# Patient Record
Sex: MALE | Race: WHITE | Employment: FULL TIME | URBAN - NONMETROPOLITAN AREA
[De-identification: names, ages, dates, MRNs, and addresses within clinical notes are randomized per-mention and may not be internally consistent; named-entity substitution may affect disease eponyms.]

---

## 2023-04-06 ENCOUNTER — OFFICE VISIT (OUTPATIENT)
Age: 23
End: 2023-04-06
Payer: COMMERCIAL

## 2023-04-06 VITALS
RESPIRATION RATE: 20 BRPM | BODY MASS INDEX: 30.69 KG/M2 | WEIGHT: 252 LBS | DIASTOLIC BLOOD PRESSURE: 82 MMHG | TEMPERATURE: 99.2 F | OXYGEN SATURATION: 99 % | HEART RATE: 94 BPM | HEIGHT: 76 IN | SYSTOLIC BLOOD PRESSURE: 138 MMHG

## 2023-04-06 DIAGNOSIS — R19.7 DIARRHEA, UNSPECIFIED TYPE: Primary | ICD-10-CM

## 2023-04-06 LAB
ADV 40+41 DNA STL QL NAA+NON-PROBE: NOT DETECTED
C CAYETANENSIS DNA STL QL NAA+NON-PROBE: NOT DETECTED
C COLI+JEJ+UPSA DNA STL QL NAA+NON-PROBE: NOT DETECTED
C DIF TOX TCDA+TCDB STL QL NAA+NON-PROBE: NOT DETECTED
CRYPTOSP DNA STL QL NAA+NON-PROBE: NOT DETECTED
E HISTOLYT DNA STL QL NAA+NON-PROBE: NOT DETECTED
EAEC PAA PLAS AGGR+AATA ST NAA+NON-PRB: NOT DETECTED
EC STX1+STX2 GENES STL QL NAA+NON-PROBE: NOT DETECTED
EPEC EAE GENE STL QL NAA+NON-PROBE: NOT DETECTED
ETEC LTA+ST1A+ST1B TOX ST NAA+NON-PROBE: NOT DETECTED
G LAMBLIA DNA STL QL NAA+NON-PROBE: NOT DETECTED
GI PATH DNA+RNA PNL STL NAA+NON-PROBE: NOT DETECTED
NOROVIRUS GI+II RNA STL QL NAA+NON-PROBE: NOT DETECTED
P SHIGELLOIDES DNA STL QL NAA+NON-PROBE: NOT DETECTED
RVA RNA STL QL NAA+NON-PROBE: NOT DETECTED
S ENT+BONG DNA STL QL NAA+NON-PROBE: NOT DETECTED
SAPO I+II+IV+V RNA STL QL NAA+NON-PROBE: NOT DETECTED
SHIGELLA SP+EIEC IPAH ST NAA+NON-PROBE: NOT DETECTED
V CHOL+PARA+VUL DNA STL QL NAA+NON-PROBE: NOT DETECTED
V CHOLERAE DNA STL QL NAA+NON-PROBE: NOT DETECTED
Y ENTEROCOL DNA STL QL NAA+NON-PROBE: NOT DETECTED

## 2023-04-06 PROCEDURE — 99203 OFFICE O/P NEW LOW 30 MIN: CPT | Performed by: NURSE PRACTITIONER

## 2023-04-06 SDOH — HEALTH STABILITY: PHYSICAL HEALTH: ON AVERAGE, HOW MANY DAYS PER WEEK DO YOU ENGAGE IN MODERATE TO STRENUOUS EXERCISE (LIKE A BRISK WALK)?: 4 DAYS

## 2023-04-06 SDOH — HEALTH STABILITY: PHYSICAL HEALTH: ON AVERAGE, HOW MANY MINUTES DO YOU ENGAGE IN EXERCISE AT THIS LEVEL?: 90 MIN

## 2023-04-06 ASSESSMENT — ENCOUNTER SYMPTOMS
WHEEZING: 0
TROUBLE SWALLOWING: 0
EYE PAIN: 0
EYE DISCHARGE: 0
CHEST TIGHTNESS: 0
DIARRHEA: 1
COUGH: 0
ABDOMINAL PAIN: 1
STRIDOR: 0
COLOR CHANGE: 0
CRAMPS: 1
SINUS PRESSURE: 0
SORE THROAT: 0
ABDOMINAL DISTENTION: 0
SHORTNESS OF BREATH: 0

## 2023-04-06 ASSESSMENT — SOCIAL DETERMINANTS OF HEALTH (SDOH)

## 2023-04-06 NOTE — PATIENT INSTRUCTIONS
GI panel pending  BRAT diet  Patient declines work note  Recommend probiotic  If symptoms worsen go to ER    Patient verbalized understanding and agrees to treatment plan.

## 2023-04-06 NOTE — PROGRESS NOTES
Negative for congestion, sinus pressure, sore throat and trouble swallowing. Eyes:  Negative for pain and discharge. Respiratory:  Negative for cough, chest tightness, shortness of breath, wheezing and stridor. Cardiovascular:  Negative for chest pain and palpitations. Gastrointestinal:  Positive for abdominal pain and diarrhea. Negative for abdominal distention. Genitourinary:  Negative for difficulty urinating, dysuria and hematuria. Musculoskeletal:  Negative for arthralgias, neck pain and neck stiffness. Skin:  Negative for color change and rash. Neurological:  Negative for dizziness, syncope, speech difficulty, weakness and numbness. Psychiatric/Behavioral:  Negative for confusion and suicidal ideas. Objective    Physical Exam  Vitals and nursing note reviewed. Constitutional:       General: He is not in acute distress. Appearance: Normal appearance. HENT:      Head: Normocephalic. Right Ear: External ear normal.      Left Ear: External ear normal.      Nose: Nose normal. No congestion. Mouth/Throat:      Mouth: Mucous membranes are moist.      Pharynx: Oropharynx is clear. No posterior oropharyngeal erythema. Eyes:      Conjunctiva/sclera: Conjunctivae normal.      Pupils: Pupils are equal, round, and reactive to light. Cardiovascular:      Rate and Rhythm: Normal rate and regular rhythm. Pulses: Normal pulses. Heart sounds: Normal heart sounds. No murmur heard. Pulmonary:      Effort: Pulmonary effort is normal. No respiratory distress. Breath sounds: Normal breath sounds. No stridor. No wheezing. Abdominal:      General: Abdomen is flat. There is no distension. Tenderness: There is no abdominal tenderness. Comments: Bowel sounds hyperactive in all four quadrants   Musculoskeletal:         General: No swelling or deformity. Normal range of motion. Cervical back: Normal range of motion. No rigidity or tenderness.    Skin:

## 2023-04-07 ENCOUNTER — OFFICE VISIT (OUTPATIENT)
Dept: INTERNAL MEDICINE | Age: 23
End: 2023-04-07

## 2023-04-07 VITALS
OXYGEN SATURATION: 99 % | SYSTOLIC BLOOD PRESSURE: 132 MMHG | DIASTOLIC BLOOD PRESSURE: 82 MMHG | HEART RATE: 85 BPM | WEIGHT: 247 LBS | HEIGHT: 76 IN | BODY MASS INDEX: 30.08 KG/M2

## 2023-04-07 DIAGNOSIS — B07.9 VIRAL WARTS, UNSPECIFIED TYPE: ICD-10-CM

## 2023-04-07 DIAGNOSIS — Z00.00 ANNUAL PHYSICAL EXAM: Primary | ICD-10-CM

## 2023-04-07 PROBLEM — S92.919A TOE FRACTURE: Status: ACTIVE | Noted: 2023-04-07

## 2023-04-07 SDOH — ECONOMIC STABILITY: FOOD INSECURITY: WITHIN THE PAST 12 MONTHS, YOU WORRIED THAT YOUR FOOD WOULD RUN OUT BEFORE YOU GOT MONEY TO BUY MORE.: NEVER TRUE

## 2023-04-07 SDOH — ECONOMIC STABILITY: INCOME INSECURITY: HOW HARD IS IT FOR YOU TO PAY FOR THE VERY BASICS LIKE FOOD, HOUSING, MEDICAL CARE, AND HEATING?: NOT HARD AT ALL

## 2023-04-07 SDOH — ECONOMIC STABILITY: FOOD INSECURITY: WITHIN THE PAST 12 MONTHS, THE FOOD YOU BOUGHT JUST DIDN'T LAST AND YOU DIDN'T HAVE MONEY TO GET MORE.: NEVER TRUE

## 2023-04-07 SDOH — ECONOMIC STABILITY: HOUSING INSECURITY
IN THE LAST 12 MONTHS, WAS THERE A TIME WHEN YOU DID NOT HAVE A STEADY PLACE TO SLEEP OR SLEPT IN A SHELTER (INCLUDING NOW)?: NO

## 2023-04-07 ASSESSMENT — ENCOUNTER SYMPTOMS
ABDOMINAL DISTENTION: 0
EYE ITCHING: 0
SORE THROAT: 0
VOMITING: 0
TROUBLE SWALLOWING: 0
WHEEZING: 0
BACK PAIN: 0
EYE DISCHARGE: 0
SHORTNESS OF BREATH: 0
BLOOD IN STOOL: 0
NAUSEA: 0
ABDOMINAL PAIN: 0
SINUS PRESSURE: 0

## 2023-04-07 ASSESSMENT — PATIENT HEALTH QUESTIONNAIRE - PHQ9
SUM OF ALL RESPONSES TO PHQ QUESTIONS 1-9: 0
2. FEELING DOWN, DEPRESSED OR HOPELESS: 0
1. LITTLE INTEREST OR PLEASURE IN DOING THINGS: 0
SUM OF ALL RESPONSES TO PHQ9 QUESTIONS 1 & 2: 0
SUM OF ALL RESPONSES TO PHQ QUESTIONS 1-9: 0

## 2023-04-07 NOTE — PROGRESS NOTES
hematuria and urgency. Musculoskeletal:  Negative for arthralgias, back pain and joint swelling. Skin:  Negative for rash and wound. Allergic/Immunologic: Negative for environmental allergies and food allergies. Neurological:  Negative for dizziness, tremors, syncope, weakness, numbness and headaches. Hematological:  Negative for adenopathy. Psychiatric/Behavioral:  Negative for agitation and hallucinations. The patient is not nervous/anxious. Objective:      /82 (Site: Left Upper Arm)   Pulse 85   Ht 6' 4\" (1.93 m)   Wt 247 lb (112 kg)   SpO2 99%   BMI 30.07 kg/m²    Physical Exam  Constitutional:       General: He is not in acute distress. Appearance: He is well-developed. He is not diaphoretic. HENT:      Head: Normocephalic and atraumatic. Right Ear: External ear normal.      Left Ear: External ear normal.      Nose: Nose normal.      Mouth/Throat:      Pharynx: No oropharyngeal exudate. Eyes:      General: No scleral icterus. Right eye: No discharge. Left eye: No discharge. Conjunctiva/sclera: Conjunctivae normal.      Pupils: Pupils are equal, round, and reactive to light. Neck:      Thyroid: No thyromegaly. Vascular: No JVD. Trachea: No tracheal deviation. Cardiovascular:      Rate and Rhythm: Normal rate and regular rhythm. Heart sounds: Normal heart sounds. No murmur heard. No friction rub. No gallop. Pulmonary:      Effort: Pulmonary effort is normal. No respiratory distress. Breath sounds: Normal breath sounds. No wheezing or rales. Abdominal:      General: Bowel sounds are normal. There is no distension. Palpations: Abdomen is soft. There is no mass. Tenderness: There is no abdominal tenderness. There is no guarding or rebound. Musculoskeletal:         General: No tenderness or deformity. Normal range of motion. Cervical back: Normal range of motion and neck supple.    Lymphadenopathy:

## 2023-04-10 DIAGNOSIS — Z00.00 ANNUAL PHYSICAL EXAM: ICD-10-CM

## 2023-04-10 LAB
ALBUMIN SERPL-MCNC: 4.2 G/DL (ref 3.5–5.2)
ALP SERPL-CCNC: 86 U/L (ref 40–130)
ALT SERPL-CCNC: 54 U/L (ref 5–41)
ANION GAP SERPL CALCULATED.3IONS-SCNC: 8 MMOL/L (ref 7–19)
AST SERPL-CCNC: 32 U/L (ref 5–40)
BASOPHILS # BLD: 0 K/UL (ref 0–0.2)
BASOPHILS NFR BLD: 0.5 % (ref 0–1)
BILIRUB SERPL-MCNC: 0.5 MG/DL (ref 0.2–1.2)
BUN SERPL-MCNC: 18 MG/DL (ref 6–20)
CALCIUM SERPL-MCNC: 9 MG/DL (ref 8.6–10)
CHLORIDE SERPL-SCNC: 104 MMOL/L (ref 98–111)
CHOLEST SERPL-MCNC: 137 MG/DL (ref 160–199)
CO2 SERPL-SCNC: 28 MMOL/L (ref 22–29)
CREAT SERPL-MCNC: 0.9 MG/DL (ref 0.5–1.2)
EOSINOPHIL # BLD: 0.1 K/UL (ref 0–0.6)
EOSINOPHIL NFR BLD: 1.5 % (ref 0–5)
ERYTHROCYTE [DISTWIDTH] IN BLOOD BY AUTOMATED COUNT: 13 % (ref 11.5–14.5)
GLUCOSE SERPL-MCNC: 95 MG/DL (ref 74–109)
HCT VFR BLD AUTO: 47.5 % (ref 42–52)
HDLC SERPL-MCNC: 50 MG/DL (ref 55–121)
HGB BLD-MCNC: 15.6 G/DL (ref 14–18)
IMM GRANULOCYTES # BLD: 0 K/UL
LDLC SERPL CALC-MCNC: 71 MG/DL
LYMPHOCYTES # BLD: 3 K/UL (ref 1.1–4.5)
LYMPHOCYTES NFR BLD: 45.8 % (ref 20–40)
MCH RBC QN AUTO: 30.5 PG (ref 27–31)
MCHC RBC AUTO-ENTMCNC: 32.8 G/DL (ref 33–37)
MCV RBC AUTO: 92.8 FL (ref 80–94)
MONOCYTES # BLD: 0.7 K/UL (ref 0–0.9)
MONOCYTES NFR BLD: 10.1 % (ref 0–10)
NEUTROPHILS # BLD: 2.8 K/UL (ref 1.5–7.5)
NEUTS SEG NFR BLD: 41.8 % (ref 50–65)
PLATELET # BLD AUTO: 146 K/UL (ref 130–400)
PMV BLD AUTO: 11.5 FL (ref 9.4–12.4)
POTASSIUM SERPL-SCNC: 4 MMOL/L (ref 3.5–5)
PROT SERPL-MCNC: 7 G/DL (ref 6.6–8.7)
RBC # BLD AUTO: 5.12 M/UL (ref 4.7–6.1)
SODIUM SERPL-SCNC: 140 MMOL/L (ref 136–145)
TRIGL SERPL-MCNC: 80 MG/DL (ref 0–149)
WBC # BLD AUTO: 6.6 K/UL (ref 4.8–10.8)

## 2023-09-28 ENCOUNTER — OFFICE VISIT (OUTPATIENT)
Age: 23
End: 2023-09-28
Payer: COMMERCIAL

## 2023-09-28 VITALS
WEIGHT: 245 LBS | HEIGHT: 76 IN | SYSTOLIC BLOOD PRESSURE: 110 MMHG | RESPIRATION RATE: 20 BRPM | OXYGEN SATURATION: 98 % | DIASTOLIC BLOOD PRESSURE: 70 MMHG | TEMPERATURE: 98.2 F | BODY MASS INDEX: 29.83 KG/M2 | HEART RATE: 78 BPM

## 2023-09-28 DIAGNOSIS — H02.841 SWELLING OF RIGHT UPPER EYELID: Primary | ICD-10-CM

## 2023-09-28 PROCEDURE — 99213 OFFICE O/P EST LOW 20 MIN: CPT | Performed by: NURSE PRACTITIONER

## 2023-09-28 ASSESSMENT — ENCOUNTER SYMPTOMS
EYE DISCHARGE: 0
EYE REDNESS: 0
GASTROINTESTINAL NEGATIVE: 1
RESPIRATORY NEGATIVE: 1
EYES NEGATIVE: 1
EYE PAIN: 0
ALLERGIC/IMMUNOLOGIC NEGATIVE: 1
EYE ITCHING: 0
PHOTOPHOBIA: 0

## 2023-09-28 NOTE — PROGRESS NOTES
Gold San (:  2000) is a 20 y.o. male,Established patient, here for evaluation of the following chief complaint(s):  Eye Pain (Right/swelling)    Patient presents today complaining of right upper eyelid swelling, redness, and mild tenderness that began 1 week ago. It has slightly improved. He has been applying warm compresses. He admits to mild photophobia. Denies vision change or eye pain. Denies eye discharge. Patient instructed to go to the walk in eye clinic today for evaluation. Patient verbalized understanding and agrees to plan of care. ASSESSMENT/PLAN:  1. Swelling of right eyelid     No orders of the defined types were placed in this encounter. No follow-ups on file. Subjective   SUBJECTIVE/OBJECTIVE:  Eye Pain   Pertinent negatives include no eye discharge, eye redness, itching or photophobia. Review of Systems   Constitutional: Negative. HENT: Negative. Eyes: Negative. Negative for photophobia, pain, discharge, redness, itching and visual disturbance. Right upper eyelid swelling, redness, and tenderness     Respiratory: Negative. Cardiovascular: Negative. Gastrointestinal: Negative. Endocrine: Negative. Genitourinary: Negative. Musculoskeletal: Negative. Skin: Negative. Allergic/Immunologic: Negative. Neurological: Negative. Hematological: Negative. Psychiatric/Behavioral: Negative. Objective   Physical Exam  Vitals reviewed. Eyes:        Comments: Large firm red nodule on upper right eyelid. Eyelid is red. No pin point head visualized even under eyelid. No eye drainage. Area is not very tender to palpation. Possible cyst.    Skin:     General: Skin is warm and dry. Neurological:      Mental Status: He is alert. Patient Instructions     Go to the walk in [facility] clinic today to have your eye evaluated. An electronic signature was used to authenticate this note.     --Navent

## 2024-01-24 ENCOUNTER — OFFICE VISIT (OUTPATIENT)
Age: 24
End: 2024-01-24
Payer: COMMERCIAL

## 2024-01-24 VITALS
RESPIRATION RATE: 20 BRPM | BODY MASS INDEX: 30.56 KG/M2 | OXYGEN SATURATION: 98 % | TEMPERATURE: 99.6 F | DIASTOLIC BLOOD PRESSURE: 76 MMHG | HEIGHT: 76 IN | SYSTOLIC BLOOD PRESSURE: 128 MMHG | WEIGHT: 251 LBS | HEART RATE: 88 BPM

## 2024-01-24 DIAGNOSIS — H66.91 RIGHT OTITIS MEDIA, UNSPECIFIED OTITIS MEDIA TYPE: ICD-10-CM

## 2024-01-24 DIAGNOSIS — J02.9 SORE THROAT: ICD-10-CM

## 2024-01-24 DIAGNOSIS — J02.0 STREP THROAT: ICD-10-CM

## 2024-01-24 DIAGNOSIS — R09.89 CHEST CONGESTION: Primary | ICD-10-CM

## 2024-01-24 LAB
INFLUENZA A ANTIBODY: NORMAL
INFLUENZA B ANTIBODY: NORMAL
S PYO AG THROAT QL: POSITIVE

## 2024-01-24 PROCEDURE — 99213 OFFICE O/P EST LOW 20 MIN: CPT | Performed by: NURSE PRACTITIONER

## 2024-01-24 RX ORDER — AMOXICILLIN AND CLAVULANATE POTASSIUM 875; 125 MG/1; MG/1
1 TABLET, FILM COATED ORAL 2 TIMES DAILY
Qty: 20 TABLET | Refills: 0 | Status: SHIPPED | OUTPATIENT
Start: 2024-01-24 | End: 2024-02-03

## 2024-01-24 ASSESSMENT — VISUAL ACUITY: OU: 1

## 2024-01-24 ASSESSMENT — ENCOUNTER SYMPTOMS
VOMITING: 0
COUGH: 1
ABDOMINAL PAIN: 0
EYES NEGATIVE: 1
SORE THROAT: 1
SHORTNESS OF BREATH: 0
ALLERGIC/IMMUNOLOGIC NEGATIVE: 1
SINUS PRESSURE: 0
NAUSEA: 0
DIARRHEA: 0

## 2024-01-24 NOTE — PROGRESS NOTES
[facility]  [facility]  [address] [place] [id number]  Dept: [phone]  Dept Fax: [phone]  Loc: [phone]    Harpreet Canseco is a 23 y.o. male who presents today for his medical conditions/complaintsas noted below.  Harpreet Canseco is c/o of Cough, Congestion, and Sore Throat        HPI:     Pharyngitis  This is a new problem. The current episode started in the past 7 days (since Monday). The problem occurs constantly. The problem has been unchanged. Associated symptoms include congestion, coughing, a fever and a sore throat. Pertinent negatives include no abdominal pain, arthralgias, chills, headaches, myalgias, nausea, rash or vomiting. Nothing aggravates the symptoms. He has tried rest for the symptoms. The treatment provided mild relief.     Harpreet is here with complaint of sore throat, cough and congestion since Monday. He tells me it hurts to breath. He has had low grade fever around 99.6. He is eating and drinking well and has been taking nothing OTC for symptoms. He has had no known sick contacts.  Past Medical History:   Diagnosis Date    Asthma     Had asthma when i was a kid havent had an attack in 10 years     Past Surgical History:   Procedure Laterality Date    FRACTURE SURGERY  June 2013       Family History   Problem Relation Age of Onset    High Blood Pressure Father     Arthritis Maternal Grandfather        Social History     Tobacco Use    Smoking status: Former     Current packs/day: 0.25     Average packs/day: 0.3 packs/day for 4.0 years (1.0 ttl pk-yrs)     Types: Cigarettes, Cigars, E-Cigarettes    Smokeless tobacco: Never   Substance Use Topics    Alcohol use: Yes     Alcohol/week: 8.0 standard drinks of alcohol     Types: 4 Cans of beer, 4 Shots of liquor per week      Current Outpatient Medications   Medication Sig Dispense Refill    amoxicillin-clavulanate (AUGMENTIN) 875-125 MG per tablet Take 1 tablet by mouth

## 2024-01-24 NOTE — PATIENT INSTRUCTIONS
Plenty of fluids  Rest  OTC Tylenol or Motrin as needed   Change and replace toothbrush on Friday  Off work until Friday  Augmentin as directed  Follow up with PCP or return to Urgent Care for worsening or unresolved symptoms.

## 2024-03-29 DIAGNOSIS — Z00.00 ANNUAL PHYSICAL EXAM: Primary | ICD-10-CM

## 2024-03-29 DIAGNOSIS — Z00.00 ANNUAL PHYSICAL EXAM: ICD-10-CM

## 2024-03-29 LAB
ALBUMIN SERPL-MCNC: 4.7 G/DL (ref 3.5–5.2)
ALP SERPL-CCNC: 79 U/L (ref 40–130)
ALT SERPL-CCNC: 18 U/L (ref 5–41)
ANION GAP SERPL CALCULATED.3IONS-SCNC: 9 MMOL/L (ref 7–19)
AST SERPL-CCNC: 24 U/L (ref 5–40)
BASOPHILS # BLD: 0 K/UL (ref 0–0.2)
BASOPHILS NFR BLD: 0.3 % (ref 0–1)
BILIRUB SERPL-MCNC: 0.8 MG/DL (ref 0.2–1.2)
BUN SERPL-MCNC: 13 MG/DL (ref 6–20)
CALCIUM SERPL-MCNC: 9.2 MG/DL (ref 8.6–10)
CHLORIDE SERPL-SCNC: 104 MMOL/L (ref 98–111)
CHOLEST SERPL-MCNC: 160 MG/DL (ref 160–199)
CO2 SERPL-SCNC: 29 MMOL/L (ref 22–29)
CREAT SERPL-MCNC: 1 MG/DL (ref 0.5–1.2)
EOSINOPHIL # BLD: 0.1 K/UL (ref 0–0.6)
EOSINOPHIL NFR BLD: 1.2 % (ref 0–5)
ERYTHROCYTE [DISTWIDTH] IN BLOOD BY AUTOMATED COUNT: 12.9 % (ref 11.5–14.5)
GLUCOSE SERPL-MCNC: 95 MG/DL (ref 74–109)
HCT VFR BLD AUTO: 47.8 % (ref 42–52)
HDLC SERPL-MCNC: 54 MG/DL (ref 55–121)
HGB BLD-MCNC: 15.8 G/DL (ref 14–18)
IMM GRANULOCYTES # BLD: 0 K/UL
LDLC SERPL CALC-MCNC: 96 MG/DL
LYMPHOCYTES # BLD: 2.7 K/UL (ref 1.1–4.5)
LYMPHOCYTES NFR BLD: 45.5 % (ref 20–40)
MCH RBC QN AUTO: 30.3 PG (ref 27–31)
MCHC RBC AUTO-ENTMCNC: 33.1 G/DL (ref 33–37)
MCV RBC AUTO: 91.6 FL (ref 80–94)
MONOCYTES # BLD: 0.7 K/UL (ref 0–0.9)
MONOCYTES NFR BLD: 11.1 % (ref 0–10)
NEUTROPHILS # BLD: 2.4 K/UL (ref 1.5–7.5)
NEUTS SEG NFR BLD: 41.7 % (ref 50–65)
PLATELET # BLD AUTO: 168 K/UL (ref 130–400)
PMV BLD AUTO: 11.9 FL (ref 9.4–12.4)
POTASSIUM SERPL-SCNC: 4.3 MMOL/L (ref 3.5–5)
PROT SERPL-MCNC: 7.2 G/DL (ref 6.6–8.7)
RBC # BLD AUTO: 5.22 M/UL (ref 4.7–6.1)
SODIUM SERPL-SCNC: 142 MMOL/L (ref 136–145)
TRIGL SERPL-MCNC: 50 MG/DL (ref 0–149)
WBC # BLD AUTO: 5.9 K/UL (ref 4.8–10.8)

## 2024-04-07 ASSESSMENT — PATIENT HEALTH QUESTIONNAIRE - PHQ9
SUM OF ALL RESPONSES TO PHQ QUESTIONS 1-9: 0
SUM OF ALL RESPONSES TO PHQ QUESTIONS 1-9: 0
2. FEELING DOWN, DEPRESSED OR HOPELESS: NOT AT ALL
SUM OF ALL RESPONSES TO PHQ9 QUESTIONS 1 & 2: 0
1. LITTLE INTEREST OR PLEASURE IN DOING THINGS: NOT AT ALL
SUM OF ALL RESPONSES TO PHQ QUESTIONS 1-9: 0
SUM OF ALL RESPONSES TO PHQ QUESTIONS 1-9: 0

## 2024-04-08 ASSESSMENT — PATIENT HEALTH QUESTIONNAIRE - PHQ9
2. FEELING DOWN, DEPRESSED OR HOPELESS: NOT AT ALL
1. LITTLE INTEREST OR PLEASURE IN DOING THINGS: NOT AT ALL
SUM OF ALL RESPONSES TO PHQ9 QUESTIONS 1 & 2: 0

## 2024-04-09 ENCOUNTER — OFFICE VISIT (OUTPATIENT)
Dept: INTERNAL MEDICINE | Age: 24
End: 2024-04-09
Payer: COMMERCIAL

## 2024-04-09 VITALS
OXYGEN SATURATION: 98 % | BODY MASS INDEX: 29.94 KG/M2 | HEART RATE: 72 BPM | DIASTOLIC BLOOD PRESSURE: 86 MMHG | WEIGHT: 246 LBS | SYSTOLIC BLOOD PRESSURE: 128 MMHG

## 2024-04-09 DIAGNOSIS — E78.00 HYPERCHOLESTEREMIA: Primary | ICD-10-CM

## 2024-04-09 PROCEDURE — 99214 OFFICE O/P EST MOD 30 MIN: CPT | Performed by: INTERNAL MEDICINE

## 2024-04-09 SDOH — ECONOMIC STABILITY: FOOD INSECURITY: WITHIN THE PAST 12 MONTHS, THE FOOD YOU BOUGHT JUST DIDN'T LAST AND YOU DIDN'T HAVE MONEY TO GET MORE.: NEVER TRUE

## 2024-04-09 SDOH — ECONOMIC STABILITY: INCOME INSECURITY: HOW HARD IS IT FOR YOU TO PAY FOR THE VERY BASICS LIKE FOOD, HOUSING, MEDICAL CARE, AND HEATING?: NOT HARD AT ALL

## 2024-04-09 SDOH — ECONOMIC STABILITY: FOOD INSECURITY: WITHIN THE PAST 12 MONTHS, YOU WORRIED THAT YOUR FOOD WOULD RUN OUT BEFORE YOU GOT MONEY TO BUY MORE.: NEVER TRUE

## 2024-04-09 ASSESSMENT — ENCOUNTER SYMPTOMS
ABDOMINAL DISTENTION: 0
ABDOMINAL PAIN: 0
SHORTNESS OF BREATH: 0
WHEEZING: 0
EYE DISCHARGE: 0
TROUBLE SWALLOWING: 0
SINUS PRESSURE: 0
VOMITING: 0
EYE ITCHING: 0
SORE THROAT: 0
NAUSEA: 0
BACK PAIN: 0
BLOOD IN STOOL: 0

## 2024-04-09 NOTE — PROGRESS NOTES
frequency, hematuria and urgency.   Musculoskeletal:  Negative for arthralgias, back pain and joint swelling.   Skin:  Negative for rash and wound.   Allergic/Immunologic: Negative for environmental allergies and food allergies.   Neurological:  Negative for dizziness, tremors, syncope, weakness, numbness and headaches.   Hematological:  Negative for adenopathy.   Psychiatric/Behavioral:  Negative for agitation and hallucinations. The patient is not nervous/anxious.        Objective:      /86   Pulse 72   Wt 111.6 kg (246 lb)   SpO2 98%   BMI 29.94 kg/m²    Physical Exam  Constitutional:       General: He is not in acute distress.     Appearance: He is well-developed. He is not diaphoretic.   HENT:      Head: Normocephalic and atraumatic.      Right Ear: External ear normal.      Left Ear: External ear normal.      Nose: Nose normal.      Mouth/Throat:      Pharynx: No oropharyngeal exudate.   Eyes:      General: No scleral icterus.        Right eye: No discharge.         Left eye: No discharge.      Conjunctiva/sclera: Conjunctivae normal.      Pupils: Pupils are equal, round, and reactive to light.   Neck:      Thyroid: No thyromegaly.      Vascular: No JVD.      Trachea: No tracheal deviation.   Cardiovascular:      Rate and Rhythm: Normal rate and regular rhythm.      Heart sounds: Normal heart sounds. No murmur heard.     No friction rub. No gallop.   Pulmonary:      Effort: Pulmonary effort is normal. No respiratory distress.      Breath sounds: Normal breath sounds. No wheezing or rales.   Abdominal:      General: Bowel sounds are normal. There is no distension.      Palpations: Abdomen is soft. There is no mass.      Tenderness: There is no abdominal tenderness. There is no guarding or rebound.   Musculoskeletal:         General: No tenderness or deformity. Normal range of motion.      Cervical back: Normal range of motion and neck supple.   Lymphadenopathy:      Cervical: No cervical adenopathy.

## 2024-04-29 ENCOUNTER — OFFICE VISIT (OUTPATIENT)
Age: 24
End: 2024-04-29
Payer: COMMERCIAL

## 2024-04-29 VITALS
RESPIRATION RATE: 20 BRPM | WEIGHT: 234 LBS | SYSTOLIC BLOOD PRESSURE: 116 MMHG | OXYGEN SATURATION: 98 % | BODY MASS INDEX: 28.48 KG/M2 | HEART RATE: 87 BPM | DIASTOLIC BLOOD PRESSURE: 70 MMHG | TEMPERATURE: 98.8 F

## 2024-04-29 DIAGNOSIS — J02.9 SORE THROAT: ICD-10-CM

## 2024-04-29 DIAGNOSIS — R52 BODY ACHES: ICD-10-CM

## 2024-04-29 DIAGNOSIS — J10.1 INFLUENZA A: Primary | ICD-10-CM

## 2024-04-29 LAB
INFLUENZA A ANTIBODY: POSITIVE
INFLUENZA B ANTIBODY: NEGATIVE
S PYO AG THROAT QL: NORMAL

## 2024-04-29 PROCEDURE — 87880 STREP A ASSAY W/OPTIC: CPT

## 2024-04-29 PROCEDURE — 87804 INFLUENZA ASSAY W/OPTIC: CPT

## 2024-04-29 PROCEDURE — 99213 OFFICE O/P EST LOW 20 MIN: CPT

## 2024-04-29 RX ORDER — OSELTAMIVIR PHOSPHATE 75 MG/1
75 CAPSULE ORAL 2 TIMES DAILY
Qty: 10 CAPSULE | Refills: 0 | Status: SHIPPED | OUTPATIENT
Start: 2024-04-29 | End: 2024-05-04

## 2024-04-29 ASSESSMENT — ENCOUNTER SYMPTOMS
DIARRHEA: 0
WHEEZING: 0
NAUSEA: 0
BACK PAIN: 0
RHINORRHEA: 0
VOMITING: 0
SINUS PAIN: 0
EYE ITCHING: 0
ALLERGIC/IMMUNOLOGIC NEGATIVE: 1
SHORTNESS OF BREATH: 0
COUGH: 1
CONSTIPATION: 0
ABDOMINAL PAIN: 0
CHEST TIGHTNESS: 0
SORE THROAT: 1
EYE DISCHARGE: 0
EYE REDNESS: 0

## 2024-04-29 NOTE — PROGRESS NOTES
[facility]  [facility]  [address] [place] [address] [place] [address]  Dept: [phone]  Dept Fax: [phone]  Loc: [phone]    Harpreet Canseco is a 24 y.o. male who presents today for his medical conditions/complaints as noted below.  Harpreet Canseco is complaining of Cough, Sore Throat, Headache, and Generalized Body Aches      HPI:     Harpreet Canseco presents to clinic for evaluation of sore throat, cough, headache, body aches. Symptoms began Saturday. Denies known exposure to sick contacts. Denies fever. Denies any alleviating factors. His symptoms are mild ; he is stable.     Past Medical History:   Diagnosis Date    Asthma     Had asthma when i was a kid havent had an attack in 10 years       Past Surgical History:   Procedure Laterality Date    FRACTURE SURGERY  June 2013       Family History   Problem Relation Age of Onset    High Blood Pressure Father     Arthritis Maternal Grandfather        Social History     Tobacco Use    Smoking status: Former     Current packs/day: 0.25     Average packs/day: 0.3 packs/day for 4.0 years (1.0 ttl pk-yrs)     Types: Cigarettes, Cigars, E-Cigarettes    Smokeless tobacco: Never   Substance Use Topics    Alcohol use: Yes     Alcohol/week: 8.0 standard drinks of alcohol     Types: 4 Cans of beer, 4 Shots of liquor per week        Current Outpatient Medications   Medication Sig Dispense Refill    oseltamivir (TAMIFLU) 75 MG capsule Take 1 capsule by mouth 2 times daily for 5 days 10 capsule 0     No current facility-administered medications for this visit.       No Known Allergies    Health Maintenance   Topic Date Due    Hepatitis B vaccine (1 of 3 - 3-dose series) 04/02/2025 (Originally 2000)    DTaP/Tdap/Td vaccine (1 - Tdap) 04/02/2025 (Originally 4/29/2017)    COVID-19 Vaccine (1) 04/03/2035 (Originally 2000)    HPV vaccine (1 - Male 2-dose series) 04/10/2035 (Originally 4/29/2009)    Flu

## 2024-04-29 NOTE — PATIENT INSTRUCTIONS
- Flu test was positive for type A  - Strep test negative   - May return to work on Friday.   - Tamiflu prescribed ; take as directed.    Treatment Recommendations:   - Increase fluid intake. Recommend water and pedialyte. Avoid sodas, coffee, caffeine, and carbonated beverages as these are not hydrating.   - Rest  - OTC antihistamine, such as Claritin or Zyrtec  - OTC Delysm or Robitussin for cough and congestion   - OTC motrin/tylenol for fever and/or body aches  - Utilize throat lozenges, chloraseptic spray, or salt water gargles for sore throat.   - Monitor for signs of dehydration, such as decreased urine output, dark urine, weakness, dizziness, lethargy  - Go to ER if experience chest pain, shortness of breath, severe abdominal pain/vomiting, or life threatening symptoms.   - The patient is to follow up with PCP or return to clinic if symptoms worsen/fail to improve.    Any condition can change, despite proper treatment. Therefore, if symptoms still persist or worsen after treatment plan intitated today, patient is to go to the nearest ER, call PCP, or return to urgent care for further evaluation.     Urgent Care evaluation today is not a substitute for PCP visit. Follow up care is the patient's responsibility to discuss and review this UC visit.

## 2024-12-03 ENCOUNTER — HOSPITAL ENCOUNTER (OUTPATIENT)
Dept: GENERAL RADIOLOGY | Age: 24
Discharge: HOME | End: 2024-12-03
Payer: COMMERCIAL

## 2024-12-03 ENCOUNTER — OFFICE VISIT (OUTPATIENT)
Dept: INTERNAL MEDICINE | Age: 24
End: 2024-12-03
Payer: COMMERCIAL

## 2024-12-03 VITALS — BODY MASS INDEX: 28.49 KG/M2 | HEART RATE: 70 BPM | WEIGHT: 234 LBS | OXYGEN SATURATION: 98 % | HEIGHT: 76 IN

## 2024-12-03 DIAGNOSIS — G89.29 CHRONIC RIGHT SHOULDER PAIN: Primary | ICD-10-CM

## 2024-12-03 DIAGNOSIS — G89.29 CHRONIC RIGHT SHOULDER PAIN: ICD-10-CM

## 2024-12-03 DIAGNOSIS — M25.511 CHRONIC RIGHT SHOULDER PAIN: Primary | ICD-10-CM

## 2024-12-03 DIAGNOSIS — M25.511 CHRONIC RIGHT SHOULDER PAIN: ICD-10-CM

## 2024-12-03 PROBLEM — M25.519 CHRONIC SHOULDER PAIN: Status: ACTIVE | Noted: 2024-12-03

## 2024-12-03 PROCEDURE — 73030 X-RAY EXAM OF SHOULDER: CPT

## 2024-12-03 PROCEDURE — 99213 OFFICE O/P EST LOW 20 MIN: CPT | Performed by: INTERNAL MEDICINE

## 2024-12-03 ASSESSMENT — ENCOUNTER SYMPTOMS
BLOOD IN STOOL: 0
WHEEZING: 0
SORE THROAT: 0
EYE DISCHARGE: 0
ABDOMINAL DISTENTION: 0
EYE ITCHING: 0
BACK PAIN: 0
TROUBLE SWALLOWING: 0
NAUSEA: 0
VOMITING: 0
ABDOMINAL PAIN: 0
SHORTNESS OF BREATH: 0
SINUS PRESSURE: 0

## 2024-12-03 NOTE — PROGRESS NOTES
[facility]  [facility]  [address] [place] [address] [address]IVE  [address]  [place] [address]  Dept: [phone]  Dept Fax: [phone]  Loc: [phone]      Visit Date: 12/3/2024    Harpreet Cardona a 24 y.o. male who presents today for:  Chief Complaint   Patient presents with    Shoulder Pain         HPI:     Patient is in because of right shoulder pain.  He is got injury of a  shoulder when he was in high school and he hears a crunching and popping at times but really does not describe any pain but wants to get it evaluated to make sure there is not something else, causing problems in the future.    Past Medical History:   Diagnosis Date    Asthma     Had asthma when i was a kid havent had an attack in 10 years      Past Surgical History:   Procedure Laterality Date    FRACTURE SURGERY  June 2013       Family History   Problem Relation Age of Onset    High Blood Pressure Father     Arthritis Maternal Grandfather        Social History     Tobacco Use    Smoking status: Former     Current packs/day: 0.25     Average packs/day: 0.3 packs/day for 4.0 years (1.0 ttl pk-yrs)     Types: Cigarettes, Cigars, E-Cigarettes    Smokeless tobacco: Never   Substance Use Topics    Alcohol use: Yes     Alcohol/week: 8.0 standard drinks of alcohol     Types: 4 Cans of beer, 4 Shots of liquor per week      No current outpatient medications on file.     No current facility-administered medications for this visit.     No Known Allergies      Subjective:     Review of Systems   Constitutional:  Negative for activity change, appetite change, fatigue and fever.   HENT:  Negative for congestion, hearing loss, sinus pressure, sore throat and trouble swallowing.    Eyes:  Negative for discharge and itching.   Respiratory:  Negative for shortness of breath and wheezing.    Cardiovascular:  Negative for chest pain, palpitations and leg swelling.   Gastrointestinal:  Negative for abdominal distention,

## 2024-12-10 ENCOUNTER — TELEPHONE (OUTPATIENT)
Dept: INTERNAL MEDICINE | Age: 24
End: 2024-12-10

## 2024-12-10 NOTE — TELEPHONE ENCOUNTER
Patient was calling to talk to someone in the office about getting a referral to orthopedics for right shoulder issue

## 2024-12-13 DIAGNOSIS — G89.29 CHRONIC RIGHT SHOULDER PAIN: ICD-10-CM

## 2024-12-13 DIAGNOSIS — G89.29 CHRONIC SHOULDER PAIN, UNSPECIFIED LATERALITY: Primary | ICD-10-CM

## 2024-12-13 DIAGNOSIS — M25.511 CHRONIC RIGHT SHOULDER PAIN: ICD-10-CM

## 2024-12-13 DIAGNOSIS — M25.519 CHRONIC SHOULDER PAIN, UNSPECIFIED LATERALITY: Primary | ICD-10-CM

## 2025-01-26 ENCOUNTER — OFFICE VISIT (OUTPATIENT)
Age: 25
End: 2025-01-26

## 2025-01-26 VITALS
RESPIRATION RATE: 18 BRPM | WEIGHT: 243 LBS | HEART RATE: 62 BPM | TEMPERATURE: 97.9 F | OXYGEN SATURATION: 98 % | DIASTOLIC BLOOD PRESSURE: 74 MMHG | HEIGHT: 76 IN | BODY MASS INDEX: 29.59 KG/M2 | SYSTOLIC BLOOD PRESSURE: 122 MMHG

## 2025-01-26 DIAGNOSIS — J02.9 PHARYNGITIS, UNSPECIFIED ETIOLOGY: Primary | ICD-10-CM

## 2025-01-26 DIAGNOSIS — J02.9 SORE THROAT: ICD-10-CM

## 2025-01-26 LAB
INFLUENZA A ANTIBODY: NORMAL
INFLUENZA B ANTIBODY: NORMAL
Lab: NORMAL
QC PASS/FAIL: NORMAL
S PYO AG THROAT QL: NORMAL
SARS-COV-2, POC: NORMAL

## 2025-01-28 LAB — BACTERIA THROAT AEROBE CULT: NORMAL

## 2025-02-05 ENCOUNTER — OFFICE VISIT (OUTPATIENT)
Age: 25
End: 2025-02-05
Payer: COMMERCIAL

## 2025-02-05 ENCOUNTER — TELEPHONE (OUTPATIENT)
Age: 25
End: 2025-02-05

## 2025-02-05 VITALS — BODY MASS INDEX: 29.22 KG/M2 | WEIGHT: 240 LBS | HEIGHT: 76 IN

## 2025-02-05 DIAGNOSIS — M75.41 ROTATOR CUFF IMPINGEMENT SYNDROME OF RIGHT SHOULDER: Primary | ICD-10-CM

## 2025-02-05 DIAGNOSIS — M25.511 CHRONIC RIGHT SHOULDER PAIN: ICD-10-CM

## 2025-02-05 DIAGNOSIS — G89.29 CHRONIC RIGHT SHOULDER PAIN: ICD-10-CM

## 2025-02-05 PROCEDURE — 99204 OFFICE O/P NEW MOD 45 MIN: CPT | Performed by: ORTHOPAEDIC SURGERY

## 2025-02-05 ASSESSMENT — ENCOUNTER SYMPTOMS
ALLERGIC/IMMUNOLOGIC NEGATIVE: 1
GASTROINTESTINAL NEGATIVE: 1
RESPIRATORY NEGATIVE: 1
EYES NEGATIVE: 1

## 2025-02-05 NOTE — TELEPHONE ENCOUNTER
Patient states he needs his mri sent to [facility], [place], operated by [facility]. He said he called to see if they have it and they still have yet to receive it

## 2025-02-05 NOTE — PROGRESS NOTES
Harpreet Canseco (:  2000) is a 24 y.o. male,New patient, here for evaluation of the following chief complaint(s):  New Patient (Rt Shoulder)        Assessment & Plan  1. Shoulder pain and decreased range of motion.  The MRI results do not indicate any rotator cuff injury. However, due to the absence of contrast in the MRI, it is challenging to assess the integrity of the labrum. The presence of extra fluid in the subdeltoid region could potentially explain his discomfort. Additionally, there is a small amount of fluid within the shoulder joint itself. The lack of redundant capsular tissue in the axilla raises the suspicion of frozen shoulder or adhesive capsulitis, which could account for the reduced range of motion. The current MRI does not show any separation of the ball and socket, but it is unclear whether the labrum is detached or merely compressed. There is also a need to rule out a SLAP lesion, which could be indicative of labral insufficiency. He is advised to avoid gym activities and exercises that involve moving his arms away from his body or retraction work, and to keep his elbows in short. An MR arthrogram will be ordered to further investigate the cause of his symptoms. If the MR arthrogram does not reveal any labral tears, the focus will shift to managing the rotator cuff impingement and potential reactive adhesive capsulitis with a steroid injection and physical therapy.       ICD-10-CM    1. Rotator cuff impingement syndrome of right shoulder  M75.41 MRI SHOULDER RIGHT W CONTRAST     FL SHOULDER ARTHROGRAM RIGHT S&I      2. Chronic right shoulder pain  M25.511     G89.29           Return in about 3 months (around 2025).    SUBJECTIVE/OBJECTIVE:  History of Present Illness  The patient presents for evaluation of shoulder pain and decreased range of motion.    He experienced a shoulder dislocation approximately 11 to 12 years ago, which has not fully healed. In 2024, he began

## 2025-02-06 NOTE — TELEPHONE ENCOUNTER
Pt is calling stating [facility] can not do his MRI. He ask that it be sent to [facility].

## 2025-02-17 ENCOUNTER — HOSPITAL ENCOUNTER (OUTPATIENT)
Dept: MRI IMAGING | Age: 25
Discharge: HOME | End: 2025-02-17
Attending: ORTHOPAEDIC SURGERY
Payer: COMMERCIAL

## 2025-02-17 ENCOUNTER — HOSPITAL ENCOUNTER (OUTPATIENT)
Dept: GENERAL RADIOLOGY | Age: 25
Discharge: HOME | End: 2025-02-17
Attending: ORTHOPAEDIC SURGERY
Payer: COMMERCIAL

## 2025-02-17 DIAGNOSIS — M75.41 ROTATOR CUFF IMPINGEMENT SYNDROME OF RIGHT SHOULDER: ICD-10-CM

## 2025-02-17 PROCEDURE — 6360000004 HC RX CONTRAST MEDICATION: Performed by: ORTHOPAEDIC SURGERY

## 2025-02-17 PROCEDURE — A9577 INJ MULTIHANCE: HCPCS | Performed by: ORTHOPAEDIC SURGERY

## 2025-02-17 PROCEDURE — 73222 MRI JOINT UPR EXTREM W/DYE: CPT

## 2025-02-17 PROCEDURE — 73040 CONTRAST X-RAY OF SHOULDER: CPT

## 2025-02-17 RX ORDER — IOPAMIDOL 408 MG/ML
10 INJECTION, SOLUTION INTRATHECAL
Status: COMPLETED | OUTPATIENT
Start: 2025-02-17 | End: 2025-02-17

## 2025-02-17 RX ADMIN — GADOBENATE DIMEGLUMINE 1 ML: 529 INJECTION, SOLUTION INTRAVENOUS at 08:40

## 2025-02-17 RX ADMIN — IOPAMIDOL 10 ML: 408 INJECTION, SOLUTION INTRATHECAL at 08:00

## 2025-02-26 ENCOUNTER — OFFICE VISIT (OUTPATIENT)
Age: 25
End: 2025-02-26

## 2025-02-26 VITALS — BODY MASS INDEX: 29.22 KG/M2 | HEIGHT: 76 IN | WEIGHT: 240 LBS

## 2025-02-26 DIAGNOSIS — S46.811A PARTIAL TEAR OF SUBSCAPULARIS TENDON, RIGHT, INITIAL ENCOUNTER: Primary | ICD-10-CM

## 2025-02-26 NOTE — PROGRESS NOTES
[facility]  [facility]  [address]  [facility] [id number] [phone]       Harpreet Canseco (:  2000) is a 24 y.o. male,Established patient, here for evaluation of the following chief complaint(s):  Follow-up (Rt Shoulder  - MRI Results)        Assessment & Plan  1. Shoulder pain.  The MR arthrogram results were reviewed, showing no superior rotator cuff tear or labral detachment. However, there is increased contrast along the insertion of the subscapularis and the biceps tendon is perching anteriorly over the lesser tuberosity, indicating an unstable biceps tendon. The popping sensation during workouts is likely due to the biceps tendon subluxing in and out of its groove. There is a partial tear in the subscapularis. The labrum, rotator cuff, and glenohumeral ligaments are intact. Surgical options were discussed, including repairing the subscapularis tear and leaving the biceps tendon alone. This approach involves a shorter recovery period with 2 weeks in a sling immobilizer and therapy starting at the 2-week lianna, likely completing therapy between 6 to 8 weeks.  The risks and benefits of surgery were discussed include the risk for infection, nerve and artery damage, continued pain, continued decreased range of motion, paresthesias, paralysis, loss of limb, loss of life, fracture, failure of repair, failure of hardware, blood clot and the need for further surgery.  The patient indicates his understanding and wishes to proceed.    PLAN:  We will move forward with surgical planning and scheduling of the patient's right shoulder arthroscopic subscapularis repair.       ICD-10-CM    1. Partial tear of subscapularis tendon, right, initial encounter  S46.811A           Return in about 4 weeks (around 3/26/2025).    SUBJECTIVE/OBJECTIVE:  History of Present Illness  The patient presents for evaluation of shoulder pain.    He reports

## 2025-03-07 ENCOUNTER — PREP FOR PROCEDURE (OUTPATIENT)
Age: 25
End: 2025-03-07

## 2025-03-07 ENCOUNTER — HOSPITAL ENCOUNTER (OUTPATIENT)
Dept: PREADMISSION TESTING | Age: 25
Discharge: HOME | End: 2025-03-11

## 2025-03-07 VITALS — BODY MASS INDEX: 29.21 KG/M2 | WEIGHT: 240 LBS

## 2025-03-07 DIAGNOSIS — S46.811A PARTIAL TEAR OF SUBSCAPULARIS TENDON, RIGHT, INITIAL ENCOUNTER: Primary | ICD-10-CM

## 2025-03-10 NOTE — DISCHARGE INSTRUCTIONS
UPPER EXTREMITY POST-OP INSTRUCTIONS - DR. OLIVARES    IMPORTANT PHONE NUMBERS:   For emergencies, please call [phone]   You may reach Dr. Olivares and clinical staff at [phone]- M-F 8:00 am-5:00 pm   After 5pm or on the weekends, please call [phone]   Call immediately if you have any of the following symptoms:     Elevated temperature above 101.5 degrees for more than 48 hours after surgery     Persistent drainage from wound     Severe pain around surgical site    Sling use: The sling is provided for your comfort and to ensure proper healing of your repair following surgery. Please place the abduction pillow with the curved side against your side and the sling on the side of the pillow. Your surgery requires that you wear the sling if noted below.  __X__ At all times except bathing, dressing, and therapy. Also wear the sling during sleep.    Bathing:  _X__ Keep your dressing in place until follow up, but you may shower on the 3rd day after surgery (Ex. Monday surgery, shower on Thursday)  ** DO NOT SOAK your dressing or incision in a tub.    DRIVING:  absolutely no driving while taking pain medication.  This will be discussed at your first postop visit.    Dressings: Keep dressing/splint intact unless instructed otherwise below. SOME DRAINAGE IS NORMAL!     DO NOT touch or apply ointment to the incision.     DO NOT remove the steri-strips over the incisions (if you have steri-strips). They will         generally fall off on their own or can be removed 1 weeksafter surgery.     If you have yellow gauze and it comes off, do not worry about it. Leave them off.    Signs of infection that warrant a phone call to our clinical line:     o Excessive drainage or redness     o Red streaking coming away from the incision  o Increased pain  o Increased temperature above 101.5 degrees    Physical Therapy:        *  Your physical therapy status will be discussed with you postoperatively and at your first post-op

## 2025-03-17 ENCOUNTER — HOSPITAL ENCOUNTER (OUTPATIENT)
Age: 25
Setting detail: OUTPATIENT SURGERY
Discharge: HOME | End: 2025-03-17
Attending: ORTHOPAEDIC SURGERY | Admitting: ORTHOPAEDIC SURGERY
Payer: COMMERCIAL

## 2025-03-17 ENCOUNTER — ANESTHESIA (OUTPATIENT)
Dept: OPERATING ROOM | Age: 25
End: 2025-03-17
Payer: COMMERCIAL

## 2025-03-17 ENCOUNTER — ANESTHESIA EVENT (OUTPATIENT)
Dept: OPERATING ROOM | Age: 25
End: 2025-03-17
Payer: COMMERCIAL

## 2025-03-17 VITALS
TEMPERATURE: 97.4 F | DIASTOLIC BLOOD PRESSURE: 74 MMHG | SYSTOLIC BLOOD PRESSURE: 141 MMHG | OXYGEN SATURATION: 100 % | WEIGHT: 240 LBS | BODY MASS INDEX: 29.22 KG/M2 | HEIGHT: 76 IN | HEART RATE: 76 BPM | RESPIRATION RATE: 20 BRPM

## 2025-03-17 DIAGNOSIS — S46.811A PARTIAL TEAR OF SUBSCAPULARIS TENDON, RIGHT, INITIAL ENCOUNTER: Primary | ICD-10-CM

## 2025-03-17 PROCEDURE — 6360000002 HC RX W HCPCS: Performed by: ANESTHESIOLOGY

## 2025-03-17 PROCEDURE — 7100000011 HC PHASE II RECOVERY - ADDTL 15 MIN: Performed by: ORTHOPAEDIC SURGERY

## 2025-03-17 PROCEDURE — 2580000003 HC RX 258: Performed by: ANESTHESIOLOGY

## 2025-03-17 PROCEDURE — 3700000000 HC ANESTHESIA ATTENDED CARE: Performed by: ORTHOPAEDIC SURGERY

## 2025-03-17 PROCEDURE — 3700000001 HC ADD 15 MINUTES (ANESTHESIA): Performed by: ORTHOPAEDIC SURGERY

## 2025-03-17 PROCEDURE — 2500000003 HC RX 250 WO HCPCS: Performed by: ORTHOPAEDIC SURGERY

## 2025-03-17 PROCEDURE — 3600000004 HC SURGERY LEVEL 4 BASE: Performed by: ORTHOPAEDIC SURGERY

## 2025-03-17 PROCEDURE — 29827 SHO ARTHRS SRG RT8TR CUF RPR: CPT | Performed by: ORTHOPAEDIC SURGERY

## 2025-03-17 PROCEDURE — 3600000014 HC SURGERY LEVEL 4 ADDTL 15MIN: Performed by: ORTHOPAEDIC SURGERY

## 2025-03-17 PROCEDURE — 6360000002 HC RX W HCPCS: Performed by: NURSE ANESTHETIST, CERTIFIED REGISTERED

## 2025-03-17 PROCEDURE — 7100000010 HC PHASE II RECOVERY - FIRST 15 MIN: Performed by: ORTHOPAEDIC SURGERY

## 2025-03-17 PROCEDURE — 7100000001 HC PACU RECOVERY - ADDTL 15 MIN: Performed by: ORTHOPAEDIC SURGERY

## 2025-03-17 PROCEDURE — C1776 JOINT DEVICE (IMPLANTABLE): HCPCS | Performed by: ORTHOPAEDIC SURGERY

## 2025-03-17 PROCEDURE — 64415 NJX AA&/STRD BRCH PLXS IMG: CPT | Performed by: NURSE ANESTHETIST, CERTIFIED REGISTERED

## 2025-03-17 PROCEDURE — 7100000000 HC PACU RECOVERY - FIRST 15 MIN: Performed by: ORTHOPAEDIC SURGERY

## 2025-03-17 PROCEDURE — 2709999900 HC NON-CHARGEABLE SUPPLY: Performed by: ORTHOPAEDIC SURGERY

## 2025-03-17 PROCEDURE — 6360000002 HC RX W HCPCS: Performed by: ORTHOPAEDIC SURGERY

## 2025-03-17 PROCEDURE — 2720000010 HC SURG SUPPLY STERILE: Performed by: ORTHOPAEDIC SURGERY

## 2025-03-17 DEVICE — LOOP N TACK 3.9 TENODESIS SYS: Type: IMPLANTABLE DEVICE | Site: SHOULDER | Status: FUNCTIONAL

## 2025-03-17 RX ORDER — SODIUM CHLORIDE 0.9 % (FLUSH) 0.9 %
5-40 SYRINGE (ML) INJECTION EVERY 12 HOURS SCHEDULED
Status: DISCONTINUED | OUTPATIENT
Start: 2025-03-17 | End: 2025-03-17 | Stop reason: HOSPADM

## 2025-03-17 RX ORDER — SODIUM CHLORIDE, SODIUM LACTATE, POTASSIUM CHLORIDE, CALCIUM CHLORIDE 600; 310; 30; 20 MG/100ML; MG/100ML; MG/100ML; MG/100ML
INJECTION, SOLUTION INTRAVENOUS CONTINUOUS
Status: DISCONTINUED | OUTPATIENT
Start: 2025-03-17 | End: 2025-03-17 | Stop reason: HOSPADM

## 2025-03-17 RX ORDER — DIPHENHYDRAMINE HYDROCHLORIDE 50 MG/ML
12.5 INJECTION, SOLUTION INTRAMUSCULAR; INTRAVENOUS
Status: DISCONTINUED | OUTPATIENT
Start: 2025-03-17 | End: 2025-03-17 | Stop reason: HOSPADM

## 2025-03-17 RX ORDER — HYDROMORPHONE HYDROCHLORIDE 1 MG/ML
0.25 INJECTION, SOLUTION INTRAMUSCULAR; INTRAVENOUS; SUBCUTANEOUS EVERY 5 MIN PRN
Status: DISCONTINUED | OUTPATIENT
Start: 2025-03-17 | End: 2025-03-17 | Stop reason: HOSPADM

## 2025-03-17 RX ORDER — CYCLOBENZAPRINE HCL 10 MG
10 TABLET ORAL 3 TIMES DAILY PRN
Qty: 30 TABLET | Refills: 0 | Status: SHIPPED | OUTPATIENT
Start: 2025-03-17 | End: 2025-03-27

## 2025-03-17 RX ORDER — LIDOCAINE HYDROCHLORIDE 10 MG/ML
1 INJECTION, SOLUTION EPIDURAL; INFILTRATION; INTRACAUDAL; PERINEURAL
Status: DISCONTINUED | OUTPATIENT
Start: 2025-03-17 | End: 2025-03-17 | Stop reason: HOSPADM

## 2025-03-17 RX ORDER — ASPIRIN 81 MG/1
81 TABLET ORAL 2 TIMES DAILY
Qty: 42 TABLET | Refills: 0 | Status: SHIPPED | OUTPATIENT
Start: 2025-03-17 | End: 2025-03-17

## 2025-03-17 RX ORDER — OXYCODONE AND ACETAMINOPHEN 7.5; 325 MG/1; MG/1
1 TABLET ORAL EVERY 4 HOURS PRN
Qty: 50 TABLET | Refills: 0 | Status: SHIPPED | OUTPATIENT
Start: 2025-03-17 | End: 2025-04-14

## 2025-03-17 RX ORDER — LIDOCAINE HYDROCHLORIDE 10 MG/ML
INJECTION, SOLUTION EPIDURAL; INFILTRATION; INTRACAUDAL; PERINEURAL
Status: DISCONTINUED | OUTPATIENT
Start: 2025-03-17 | End: 2025-03-17 | Stop reason: SDUPTHER

## 2025-03-17 RX ORDER — FAMOTIDINE 20 MG/1
20 TABLET, FILM COATED ORAL ONCE
Status: DISCONTINUED | OUTPATIENT
Start: 2025-03-17 | End: 2025-03-17 | Stop reason: HOSPADM

## 2025-03-17 RX ORDER — HYDROMORPHONE HYDROCHLORIDE 1 MG/ML
0.5 INJECTION, SOLUTION INTRAMUSCULAR; INTRAVENOUS; SUBCUTANEOUS EVERY 5 MIN PRN
Status: DISCONTINUED | OUTPATIENT
Start: 2025-03-17 | End: 2025-03-17 | Stop reason: HOSPADM

## 2025-03-17 RX ORDER — CYCLOBENZAPRINE HCL 10 MG
10 TABLET ORAL 3 TIMES DAILY PRN
Qty: 30 TABLET | Refills: 0 | Status: SHIPPED | OUTPATIENT
Start: 2025-03-17 | End: 2025-03-17

## 2025-03-17 RX ORDER — SODIUM CHLORIDE 0.9 % (FLUSH) 0.9 %
5-40 SYRINGE (ML) INJECTION PRN
Status: DISCONTINUED | OUTPATIENT
Start: 2025-03-17 | End: 2025-03-17 | Stop reason: HOSPADM

## 2025-03-17 RX ORDER — METOCLOPRAMIDE HYDROCHLORIDE 5 MG/ML
10 INJECTION INTRAMUSCULAR; INTRAVENOUS
Status: DISCONTINUED | OUTPATIENT
Start: 2025-03-17 | End: 2025-03-17 | Stop reason: HOSPADM

## 2025-03-17 RX ORDER — ASPIRIN 81 MG/1
81 TABLET ORAL 2 TIMES DAILY
Qty: 42 TABLET | Refills: 0 | Status: SHIPPED | OUTPATIENT
Start: 2025-03-17 | End: 2025-04-07

## 2025-03-17 RX ORDER — MEPERIDINE HYDROCHLORIDE 25 MG/ML
12.5 INJECTION INTRAMUSCULAR; INTRAVENOUS; SUBCUTANEOUS EVERY 5 MIN PRN
Status: DISCONTINUED | OUTPATIENT
Start: 2025-03-17 | End: 2025-03-17 | Stop reason: HOSPADM

## 2025-03-17 RX ORDER — OXYCODONE AND ACETAMINOPHEN 7.5; 325 MG/1; MG/1
1 TABLET ORAL EVERY 4 HOURS PRN
Qty: 50 TABLET | Refills: 0 | Status: SHIPPED | OUTPATIENT
Start: 2025-03-17 | End: 2025-03-17

## 2025-03-17 RX ORDER — PROPOFOL 10 MG/ML
INJECTION, EMULSION INTRAVENOUS
Status: DISCONTINUED | OUTPATIENT
Start: 2025-03-17 | End: 2025-03-17 | Stop reason: SDUPTHER

## 2025-03-17 RX ORDER — NALOXONE HYDROCHLORIDE 0.4 MG/ML
INJECTION, SOLUTION INTRAMUSCULAR; INTRAVENOUS; SUBCUTANEOUS PRN
Status: DISCONTINUED | OUTPATIENT
Start: 2025-03-17 | End: 2025-03-17 | Stop reason: HOSPADM

## 2025-03-17 RX ORDER — ONDANSETRON 4 MG/1
4 TABLET, FILM COATED ORAL EVERY 8 HOURS PRN
Qty: 20 TABLET | Refills: 1 | Status: SHIPPED | OUTPATIENT
Start: 2025-03-17

## 2025-03-17 RX ORDER — CEFAZOLIN SODIUM 1 G/3ML
INJECTION, POWDER, FOR SOLUTION INTRAMUSCULAR; INTRAVENOUS
Status: DISCONTINUED | OUTPATIENT
Start: 2025-03-17 | End: 2025-03-17 | Stop reason: SDUPTHER

## 2025-03-17 RX ORDER — SODIUM CHLORIDE 9 MG/ML
INJECTION, SOLUTION INTRAVENOUS PRN
Status: DISCONTINUED | OUTPATIENT
Start: 2025-03-17 | End: 2025-03-17 | Stop reason: HOSPADM

## 2025-03-17 RX ORDER — ROPIVACAINE HYDROCHLORIDE 5 MG/ML
30 INJECTION, SOLUTION EPIDURAL; INFILTRATION; PERINEURAL ONCE
Status: DISCONTINUED | OUTPATIENT
Start: 2025-03-17 | End: 2025-03-17 | Stop reason: HOSPADM

## 2025-03-17 RX ORDER — DEXAMETHASONE SODIUM PHOSPHATE 10 MG/ML
INJECTION, SOLUTION INTRAMUSCULAR; INTRAVENOUS
Status: DISCONTINUED | OUTPATIENT
Start: 2025-03-17 | End: 2025-03-17 | Stop reason: SDUPTHER

## 2025-03-17 RX ORDER — DOCUSATE SODIUM 100 MG/1
100 CAPSULE, LIQUID FILLED ORAL 2 TIMES DAILY
Qty: 60 CAPSULE | Refills: 1 | Status: SHIPPED | OUTPATIENT
Start: 2025-03-17 | End: 2025-03-17

## 2025-03-17 RX ORDER — FENTANYL CITRATE 50 UG/ML
INJECTION, SOLUTION INTRAMUSCULAR; INTRAVENOUS
Status: DISCONTINUED | OUTPATIENT
Start: 2025-03-17 | End: 2025-03-17 | Stop reason: SDUPTHER

## 2025-03-17 RX ORDER — MIDAZOLAM HYDROCHLORIDE 2 MG/2ML
2 INJECTION, SOLUTION INTRAMUSCULAR; INTRAVENOUS
Status: COMPLETED | OUTPATIENT
Start: 2025-03-17 | End: 2025-03-17

## 2025-03-17 RX ORDER — DOCUSATE SODIUM 100 MG/1
100 CAPSULE, LIQUID FILLED ORAL 2 TIMES DAILY
Qty: 60 CAPSULE | Refills: 1 | Status: SHIPPED | OUTPATIENT
Start: 2025-03-17

## 2025-03-17 RX ORDER — SCOPOLAMINE 1 MG/3D
1 PATCH, EXTENDED RELEASE TRANSDERMAL
Status: DISCONTINUED | OUTPATIENT
Start: 2025-03-17 | End: 2025-03-17 | Stop reason: HOSPADM

## 2025-03-17 RX ORDER — ONDANSETRON 2 MG/ML
INJECTION INTRAMUSCULAR; INTRAVENOUS
Status: DISCONTINUED | OUTPATIENT
Start: 2025-03-17 | End: 2025-03-17 | Stop reason: SDUPTHER

## 2025-03-17 RX ORDER — ONDANSETRON 4 MG/1
4 TABLET, FILM COATED ORAL EVERY 8 HOURS PRN
Qty: 20 TABLET | Refills: 1 | Status: SHIPPED | OUTPATIENT
Start: 2025-03-17 | End: 2025-03-17

## 2025-03-17 RX ADMIN — LIDOCAINE HYDROCHLORIDE 1 ML: 10 INJECTION, SOLUTION INFILTRATION; PERINEURAL at 15:59

## 2025-03-17 RX ADMIN — ONDANSETRON 4 MG: 2 INJECTION INTRAMUSCULAR; INTRAVENOUS at 16:35

## 2025-03-17 RX ADMIN — CEFAZOLIN 2 G: 1 INJECTION, POWDER, FOR SOLUTION INTRAMUSCULAR; INTRAVENOUS at 16:35

## 2025-03-17 RX ADMIN — LIDOCAINE HYDROCHLORIDE 50 MG: 10 INJECTION, SOLUTION EPIDURAL; INFILTRATION; INTRACAUDAL; PERINEURAL at 16:27

## 2025-03-17 RX ADMIN — DEXAMETHASONE SODIUM PHOSPHATE 10 MG: 10 INJECTION, SOLUTION INTRAMUSCULAR; INTRAVENOUS at 16:35

## 2025-03-17 RX ADMIN — ROPIVACAINE HYDROCHLORIDE 20 ML: 5 INJECTION, SOLUTION EPIDURAL; INFILTRATION; PERINEURAL at 15:59

## 2025-03-17 RX ADMIN — SODIUM CHLORIDE, SODIUM LACTATE, POTASSIUM CHLORIDE, AND CALCIUM CHLORIDE: .6; .31; .03; .02 INJECTION, SOLUTION INTRAVENOUS at 12:57

## 2025-03-17 RX ADMIN — FENTANYL CITRATE 50 MCG: 0.05 INJECTION, SOLUTION INTRAMUSCULAR; INTRAVENOUS at 17:13

## 2025-03-17 RX ADMIN — PROPOFOL 200 MG: 10 INJECTION, EMULSION INTRAVENOUS at 16:27

## 2025-03-17 RX ADMIN — FENTANYL CITRATE 50 MCG: 0.05 INJECTION, SOLUTION INTRAMUSCULAR; INTRAVENOUS at 16:41

## 2025-03-17 RX ADMIN — MIDAZOLAM HYDROCHLORIDE 2 MG: 1 INJECTION, SOLUTION INTRAMUSCULAR; INTRAVENOUS at 15:59

## 2025-03-17 ASSESSMENT — PAIN - FUNCTIONAL ASSESSMENT
PAIN_FUNCTIONAL_ASSESSMENT: NONE - DENIES PAIN

## 2025-03-17 ASSESSMENT — LIFESTYLE VARIABLES: SMOKING_STATUS: 0

## 2025-03-17 NOTE — ANESTHESIA POSTPROCEDURE EVALUATION
Department of Anesthesiology  Postprocedure Note    Patient: Harpreet Canseco  MRN: [id number]  YOB: 2000  Date of evaluation: 3/17/2025    Procedure Summary       Date: 03/17/25 Room / Location: [address]    Anesthesia Start: 1624 Anesthesia Stop: 1725    Procedure: RIGHT SHOULDER ARTHROSCOPIC SUBSCAPULARIS REPAIR (Right) Diagnosis:       Traumatic tear of supraspinatus tendon of right shoulder      (Traumatic tear of supraspinatus tendon of right shoulder [S46.811A])    Surgeons: Adarsh Davis MD Responsible Provider: Karyna Ortega APRN - CRNA    Anesthesia Type: general, regional ASA Status: 2            Anesthesia Type: No value filed.    Kym Phase I: Kym Score: 6    Kym Phase II:      Anesthesia Post Evaluation    Patient location during evaluation: PACU  Patient participation: waiting for patient participation  Level of consciousness: sleepy but conscious  Pain score: 0  Airway patency: patent  Nausea & Vomiting: no nausea and no vomiting  Cardiovascular status: hemodynamically stable and blood pressure returned to baseline  Respiratory status: acceptable and room air  Hydration status: stable  Comments: BP (!) 103/45   Pulse 69   Temp 97.8 °F (36.6 °C)   Resp 10   Ht 1.93 m (6' 4\")   Wt 108.9 kg (240 lb)   SpO2 97%   BMI 29.21 kg/m²     Pain management: adequate    No notable events documented.

## 2025-03-17 NOTE — ANESTHESIA PRE PROCEDURE
Department of Anesthesiology  Preprocedure Note       Name:  Harpreet Canseco   Age:  24 y.o.  :  2000                                          MRN:  [id number]         Date:  3/17/2025      Surgeon: Surgeon(s):  Adarsh Davis MD    Procedure: Procedure(s):  RIGHT SHOULDER ARTHROSCOPY SUBSCAP REPAIR    Medications prior to admission:   Prior to Admission medications    Medication Sig Start Date End Date Taking? Authorizing Provider   oxyCODONE-acetaminophen (PERCOCET) 7.5-325 MG per tablet Take 1 tablet by mouth every 4 hours as needed for Pain for up to 28 days. Intended supply: 28 days Max Daily Amount: 6 tablets 3/17/25 4/14/25 Yes Adarsh Davis MD   cyclobenzaprine (FLEXERIL) 10 MG tablet Take 1 tablet by mouth 3 times daily as needed for Muscle spasms 3/17/25 3/27/25 Yes Adarsh Davis MD   docusate sodium (COLACE) 100 MG capsule Take 1 capsule by mouth 2 times daily 3/17/25  Yes Adarsh Davis MD   ondansetron (ZOFRAN) 4 MG tablet Take 1 tablet by mouth every 8 hours as needed for Nausea or Vomiting 3/17/25  Yes Adarsh Davis MD   aspirin 81 MG EC tablet Take 1 tablet by mouth 2 times daily for 21 days 3/17/25 4/7/25 Yes Adarsh Davis MD       Current medications:    Current Facility-Administered Medications   Medication Dose Route Frequency Provider Last Rate Last Admin   • lactated ringers infusion   IntraVENous Continuous Dorothy Tesfaye  mL/hr at 25 1257 New Bag at 25 1257       Allergies:  No Known Allergies    Problem List:    Patient Active Problem List   Diagnosis Code   • Toe fracture S92.919A   • Hypercholesteremia E78.00   • Chronic shoulder pain M25.519, G89.29   • Rotator cuff impingement syndrome of right shoulder M75.41   • Partial tear of subscapularis tendon, right, initial encounter S46.929B       Past Medical History:        Diagnosis Date   • Asthma     Had asthma when i was a kid havent had an attack in 10 years   • Shoulder pain        Past Surgical History:

## 2025-03-17 NOTE — BRIEF OP NOTE
Brief Postoperative Note      Patient: Harpreet Canseco  YOB: 2000  MRN: [id number]    Date of Procedure: 3/17/2025    Pre-Op Diagnosis Codes:      * Traumatic tear of subscapularis tendon of right shoulder     Post-Op Diagnosis: Same       Procedure(s):  RIGHT SHOULDER ARTHROSCOPIC SUBSCAPULARIS REPAIR    Surgeon(s):  Adarsh Davis MD    Assistant:  * No surgical staff found *    Anesthesia: General    Estimated Blood Loss (mL): Minimal    Complications: None    Specimens:   * No specimens in log *    Implants:  Implant Name Type Inv. Item Serial No.  Lot No. LRB No. Used Action   LOOP N TACK 3.9 TENODESIS SYS - [id number]  LOOP N TACK 3.9 TENODESIS SYS  [employer] [id number] Right 1 Implanted         Drains: * No LDAs found *    Findings:  Infection Present At Time Of Surgery (PATOS) (choose all levels that have infection present):  No infection present  Other Findings: Traumatic tear of the subscapularis tendon, right shoulder    Electronically signed by Adarsh Davis MD on 3/17/2025 at 5:23 PM

## 2025-03-17 NOTE — PROGRESS NOTES
All discharge instructions discussed with patient at family. All state understanding. VSS. He denies pain. Patient to dress with assist of family.

## 2025-03-17 NOTE — PROGRESS NOTES
Patient to room 6. He is alert, oriented and able to make his needs known. VSS. He denies pain. Step-mom and dad at bedside.

## 2025-03-17 NOTE — OP NOTE
Patient Name: Harpreet Canseco   : 2000   MRN: [id number]     DATE of SURGERY: 3.17.2025    SURGEON: Adarsh Davis MD    ASSISTANT: NONE    PREOPERATIVE DIAGNOSIS:  Chronic traumatic incomplete rotator cuff tear of the right shoulder    POSTOPERATIVE DIAGNOSIS:  Chronic traumatic incomplete rotator cuff tear of the right shoulder    PROCEDURE PERFORMED:  Right shoulder arthroscopic rotator cuff repair    IMPLANTS: Arthrex 3.9 mm BioSwiveLock anchor x 1    ANESTHESIA USED: General endotracheal anesthesia, interscalene block    ESTIMATED BLOOD LOSS: minimal    DRAINS: none     COMPLICATIONS: none    SPECIMENS: none    OPERATIVE INDICATIONS: This patient is a 24 y.o. male  presented to my clinic with complaints of progressive shoulder pain after a remote traumatic dislocation injury to the shoulder. An MRI was obtained which showed the above-named diagnoses. Pain was not relieved with conservative treatment consisting of anti-inflammatories, corticosteroid injections or physical therapy.  Due to progressive pain and loss of function, surgical evaluation was discussed, and the patient wished to proceed understanding risks, benefits, and alternatives. The surgical indication was to relieve pain, improve function, and prevent future disability in regard to the shoulder pathology dictated in the above-named diagnoses.  Risks included, but were not limited to, that of anesthesia, bleeding, infection, pain, damage to local structures, need for further surgery, failure of repair, stiffness, failure of implants, and loss of function.      OPERATIVE FINDINGS:  1) Exam under anesthesia:  Full passive ROM, joint stable without laxity, skin intact  2) Glenohumeral Joint:       A) Chondral surfaces: Intact         B) Labrum: Intact without tear with sublabial foramen complex       C) Biceps:  Intact without tear         D) Rotator Cuff:  Partial tear    3) Subacromial space:         A) Moderate amount of dense vascular  arthroscopic shaver was introduced in the subacromial space and a complete bursectomy was performed with this device.  The underlying rotator cuff was inspected and noted to be intact.     Attention was turned to the anterior and lateral edge of the acromion where soft tissue was removed with a cautery device.  It demonstrated a mildly prominent type II morphology.  It was ultimately elected not to Deacon.      Attention was then turned to the acromioclavicular joint where a cautery device was utilized to remove soft tissue from the distal end of the clavicle revealing appropriately preserved joint space without osteophyte formation.      Portals were then closed with Monocryl, and a sterile dressing was applied followed by a sling.  The patient was awakened from anesthesia, transported to the recovery room in stable condition.    POSTOP PLAN:    1) Discharge home with family  2) Follow up in 2 weeks for a clinical check  3) Follow the following protocol: Small/Medium RCR - based on the patient's tissue quality and repair construct, we will initiate formal physical therapy at the 2-week postoperative lianna.

## 2025-03-17 NOTE — H&P
Pt Name: Harpreet Canseco  MRN: [id number]  YOB: 2000  Date: 3/17/2025      HPI: 24 y.o. year old male with a known rotator cuff tear of the right shoulder. Chronic pain and weakness have been non responsive to conservative treatments, not limited to, NSAIDs, corticosteroid injections, and physical therapy.      Past Medical/Surgical History:   Past Medical History:   Diagnosis Date    Asthma     Had asthma when i was a kid havent had an attack in 10 years    Shoulder pain      Past Surgical History:   Procedure Laterality Date    FRACTURE SURGERY  June 2013    toe         Social History:    Smoking:  No Smoking History = 0   Alcohol: Rare social consumption    Medications:   Prior to Admission medications    Not on File       Allergies: No Known Allergies    Review of systems:     * Constitutional: negative     * HEENT: negative     * Skin: negative     * Cardiac: negative     * Respiratory: negative     * GI: negative     * : negative     * Neuro: negative     * CNS: negative     * Extremities: negative     * Endcrine: negative     Physical Exam:   BP (!) 145/80   Pulse 82   Temp 98.7 °F (37.1 °C) (Tympanic)   Resp 14   Ht 1.93 m (6' 4\")   Wt 108.9 kg (240 lb)   SpO2 98%   BMI 29.21 kg/m²     - General: normal development and appearance     - HEENT: normocephalic, LATRELL     - Skin: pink, warm, normal tone     - Neck: supple, no masses,     - Chest: no rales or wheezes, normal expansion     - Heart: RRR, no murmurs/gallops     - Abdomen: soft, nondistended, nontender, normal sounds     - Vascular: normal pulses, color, tone     - Pysch/Neuro: normal affect, mood, alert and oriented     - Musculoskeletal: Physical exam of the patient's right shoulder shows the skin is clean, dry, and intact. No deformities, lesions, or errythema noted. NVI distally and laterally. Tenderness to palpation laterally. Decreased range of motion and weakness secondary to pain.      Impression: Rotator cuff tear of the

## 2025-03-18 RX ORDER — ROPIVACAINE HYDROCHLORIDE 5 MG/ML
INJECTION, SOLUTION EPIDURAL; INFILTRATION; PERINEURAL
Status: DISCONTINUED | OUTPATIENT
Start: 2025-03-17 | End: 2025-03-18 | Stop reason: SDUPTHER

## 2025-03-18 RX ORDER — LIDOCAINE HYDROCHLORIDE 10 MG/ML
INJECTION, SOLUTION INFILTRATION; PERINEURAL
Status: DISCONTINUED | OUTPATIENT
Start: 2025-03-17 | End: 2025-03-18 | Stop reason: SDUPTHER

## 2025-03-18 NOTE — ANESTHESIA PROCEDURE NOTES
Peripheral Block    Patient location during procedure: holding area  Reason for block: post-op pain management  Start time: 3/17/2025 3:59 PM  Staffing  Performed: anesthesiologist   Anesthesiologist: Dorothy Tesfaye MD  Performed by: Dorothy Tesfaye MD  Authorized by: Karyna Ortega APRN - CRNA    Preanesthetic Checklist  Completed: patient identified, IV checked, site marked, risks and benefits discussed, surgical/procedural consents, equipment checked, pre-op evaluation, timeout performed, anesthesia consent given, oxygen available and monitors applied/VS acknowledged  Peripheral Block   Patient position: sitting  Prep: ChloraPrep  Provider prep: mask and sterile gloves  Patient monitoring: cardiac monitor, continuous pulse ox, frequent blood pressure checks and IV access  Block type: Brachial plexus  Interscalene  Laterality: right  Injection technique: single-shot  Guidance: ultrasound guided  Local infiltration: lidocaine  Infiltration strength: 1 %  Local infiltration: lidocaine  Dose: 1 mL    Needle   Needle type: combined needle/nerve stimulator   Needle gauge: 20 G  Needle localization: ultrasound guidance  Needle length: 5 cm  Assessment   Injection assessment: negative aspiration for heme, no paresthesia on injection and local visualized surrounding nerve on ultrasound  Paresthesia pain: none  Slow fractionated injection: yes  Hemodynamics: stable  Outcomes: patient tolerated procedure well    Additional Notes  20 cc 0.5% Ropivacaine injected    Under ultrasound (\"US\") guidance, a 22 gauge needle was inserted and placed in close proximity to the brachial plexus nerves. Ultrasound was also used to visualize the spread of the anesthetic in close proximity to the nerve being blocked. The nerve appeared anatomically normal, and there were no abnormal pathological findings. A permanent image was recorded.   Medications Administered  lidocaine injection 1% - Subcuticular   1 mL - 3/17/2025 3:59:00

## 2025-03-18 NOTE — ADDENDUM NOTE
Addendum  created 03/18/25 0931 by Dorothy Tesfaye MD    Child order released for a procedure order, Clinical Note Signed, Intraprocedure Blocks edited, SmartForm saved

## 2025-03-31 DIAGNOSIS — E78.00 HYPERCHOLESTEREMIA: ICD-10-CM

## 2025-03-31 LAB
ALBUMIN SERPL-MCNC: 4.3 G/DL (ref 3.5–5.2)
ALP SERPL-CCNC: 68 U/L (ref 40–129)
ALT SERPL-CCNC: 33 U/L (ref 10–50)
ANION GAP SERPL CALCULATED.3IONS-SCNC: 8 MMOL/L (ref 8–16)
AST SERPL-CCNC: 24 U/L (ref 10–50)
BASOPHILS # BLD: 0 K/UL (ref 0–0.2)
BASOPHILS NFR BLD: 0.6 % (ref 0–1)
BILIRUB SERPL-MCNC: 0.6 MG/DL (ref 0.2–1.2)
BUN SERPL-MCNC: 14 MG/DL (ref 6–20)
CALCIUM SERPL-MCNC: 9.5 MG/DL (ref 8.6–10)
CHLORIDE SERPL-SCNC: 104 MMOL/L (ref 98–107)
CHOLEST SERPL-MCNC: 124 MG/DL (ref 0–199)
CO2 SERPL-SCNC: 29 MMOL/L (ref 22–29)
CREAT SERPL-MCNC: 1.1 MG/DL (ref 0.7–1.2)
EOSINOPHIL # BLD: 0.1 K/UL (ref 0–0.6)
EOSINOPHIL NFR BLD: 1.5 % (ref 0–5)
ERYTHROCYTE [DISTWIDTH] IN BLOOD BY AUTOMATED COUNT: 12.3 % (ref 11.5–14.5)
GLUCOSE SERPL-MCNC: 97 MG/DL (ref 70–99)
HCT VFR BLD AUTO: 46.8 % (ref 42–52)
HDLC SERPL-MCNC: 45 MG/DL (ref 40–60)
HGB BLD-MCNC: 15.6 G/DL (ref 14–18)
IMM GRANULOCYTES # BLD: 0 K/UL
LDLC SERPL CALC-MCNC: 63 MG/DL
LYMPHOCYTES # BLD: 2.7 K/UL (ref 1.1–4.5)
LYMPHOCYTES NFR BLD: 40.2 % (ref 20–40)
MCH RBC QN AUTO: 30.6 PG (ref 27–31)
MCHC RBC AUTO-ENTMCNC: 33.3 G/DL (ref 33–37)
MCV RBC AUTO: 91.8 FL (ref 80–94)
MONOCYTES # BLD: 0.6 K/UL (ref 0–0.9)
MONOCYTES NFR BLD: 9.4 % (ref 0–10)
NEUTROPHILS # BLD: 3.3 K/UL (ref 1.5–7.5)
NEUTS SEG NFR BLD: 48.2 % (ref 50–65)
PLATELET # BLD AUTO: 182 K/UL (ref 130–400)
PMV BLD AUTO: 11.8 FL (ref 9.4–12.4)
POTASSIUM SERPL-SCNC: 3.9 MMOL/L (ref 3.5–5.1)
PROT SERPL-MCNC: 6.8 G/DL (ref 6.4–8.3)
RBC # BLD AUTO: 5.1 M/UL (ref 4.7–6.1)
SODIUM SERPL-SCNC: 141 MMOL/L (ref 136–145)
TRIGL SERPL-MCNC: 82 MG/DL (ref 0–149)
WBC # BLD AUTO: 6.8 K/UL (ref 4.8–10.8)

## 2025-04-01 ENCOUNTER — OFFICE VISIT (OUTPATIENT)
Age: 25
End: 2025-04-01

## 2025-04-01 VITALS — BODY MASS INDEX: 29.22 KG/M2 | WEIGHT: 240 LBS | HEIGHT: 76 IN

## 2025-04-01 DIAGNOSIS — S46.811D PARTIAL TEAR OF SUBSCAPULARIS TENDON, RIGHT, SUBSEQUENT ENCOUNTER: ICD-10-CM

## 2025-04-01 DIAGNOSIS — Z98.890 STATUS POST RIGHT ROTATOR CUFF REPAIR: Primary | ICD-10-CM

## 2025-04-01 PROCEDURE — 99024 POSTOP FOLLOW-UP VISIT: CPT | Performed by: ORTHOPAEDIC SURGERY

## 2025-04-01 ASSESSMENT — ENCOUNTER SYMPTOMS
RESPIRATORY NEGATIVE: 1
ALLERGIC/IMMUNOLOGIC NEGATIVE: 1
GASTROINTESTINAL NEGATIVE: 1
EYES NEGATIVE: 1

## 2025-04-01 NOTE — PROGRESS NOTES
Neurological: Negative.    Psychiatric/Behavioral: Negative.         Physical Exam  Musculoskeletal:  Right Shoulder:  Portal incisions are inspected after dressing removal and noted to be benign in appearance and well-approximated with no associated redness, streaking, dehiscence or discharge.  The patient demonstrates full intact elbow, forearm, wrist and hand range of motion with a surprising preserved right shoulder arc of motion.  Muscle compartments are soft compressible.  Sensation is intact distally.                An electronic signature was used to authenticate this note.    --Adarsh Davis MD   The patient (or guardian, if applicable) and other individuals in attendance with the patient were advised that Artificial Intelligence will be utilized during this visit to record, process the conversation to generate a clinical note, and support improvement of the AI technology. The patient (or guardian, if applicable) and other individuals in attendance at the appointment consented to the use of AI, including the recording.

## 2025-04-07 ASSESSMENT — PATIENT HEALTH QUESTIONNAIRE - PHQ9
2. FEELING DOWN, DEPRESSED OR HOPELESS: NOT AT ALL
1. LITTLE INTEREST OR PLEASURE IN DOING THINGS: NOT AT ALL
SUM OF ALL RESPONSES TO PHQ QUESTIONS 1-9: 0
SUM OF ALL RESPONSES TO PHQ9 QUESTIONS 1 & 2: 0
1. LITTLE INTEREST OR PLEASURE IN DOING THINGS: NOT AT ALL
SUM OF ALL RESPONSES TO PHQ QUESTIONS 1-9: 0
2. FEELING DOWN, DEPRESSED OR HOPELESS: NOT AT ALL
SUM OF ALL RESPONSES TO PHQ QUESTIONS 1-9: 0
SUM OF ALL RESPONSES TO PHQ QUESTIONS 1-9: 0

## 2025-04-10 ENCOUNTER — OFFICE VISIT (OUTPATIENT)
Dept: INTERNAL MEDICINE | Age: 25
End: 2025-04-10
Payer: COMMERCIAL

## 2025-04-10 VITALS
SYSTOLIC BLOOD PRESSURE: 110 MMHG | HEIGHT: 76 IN | BODY MASS INDEX: 28.01 KG/M2 | WEIGHT: 230 LBS | OXYGEN SATURATION: 99 % | DIASTOLIC BLOOD PRESSURE: 80 MMHG | HEART RATE: 86 BPM

## 2025-04-10 DIAGNOSIS — E78.00 HYPERCHOLESTEREMIA: ICD-10-CM

## 2025-04-10 DIAGNOSIS — S46.811D PARTIAL TEAR OF SUBSCAPULARIS TENDON, RIGHT, SUBSEQUENT ENCOUNTER: Primary | ICD-10-CM

## 2025-04-10 PROCEDURE — 99214 OFFICE O/P EST MOD 30 MIN: CPT | Performed by: INTERNAL MEDICINE

## 2025-04-10 SDOH — ECONOMIC STABILITY: FOOD INSECURITY: WITHIN THE PAST 12 MONTHS, YOU WORRIED THAT YOUR FOOD WOULD RUN OUT BEFORE YOU GOT MONEY TO BUY MORE.: NEVER TRUE

## 2025-04-10 SDOH — ECONOMIC STABILITY: FOOD INSECURITY: WITHIN THE PAST 12 MONTHS, THE FOOD YOU BOUGHT JUST DIDN'T LAST AND YOU DIDN'T HAVE MONEY TO GET MORE.: NEVER TRUE

## 2025-04-10 ASSESSMENT — ENCOUNTER SYMPTOMS
EYE DISCHARGE: 0
EYE ITCHING: 0
VOMITING: 0
ABDOMINAL DISTENTION: 0
SINUS PRESSURE: 0
TROUBLE SWALLOWING: 0
SORE THROAT: 0
SHORTNESS OF BREATH: 0
BLOOD IN STOOL: 0
WHEEZING: 0
ABDOMINAL PAIN: 0
NAUSEA: 0
BACK PAIN: 0

## 2025-04-10 NOTE — PROGRESS NOTES
[facility]  [facility]  [address] [place] [address] [address]IVE  [address]  [place] [address]  Dept: [phone]  Dept Fax: [phone]  Loc: [phone]      Visit Date: 4/10/2025    Harpreet Cardona a 24 y.o. male who presents today for:  Chief Complaint   Patient presents with    Annual Exam         HPI:     24 years old just had right shoulder surgery for supraspinatus injury and is doing well he is in physical therapy now he had lab for review    Past Medical History:   Diagnosis Date    Asthma     Had asthma when i was a kid havent had an attack in 10 years    Shoulder pain       Past Surgical History:   Procedure Laterality Date    FRACTURE SURGERY  June 2013    toe    SHOULDER ARTHROSCOPY Right 3/17/2025    RIGHT SHOULDER ARTHROSCOPIC SUBSCAPULARIS REPAIR performed by Adarsh Davis MD at [facility] OR       Family History   Problem Relation Age of Onset    Other Mother         epilepsy    High Blood Pressure Father     Arthritis Maternal Grandfather        Social History     Tobacco Use    Smoking status: Former     Current packs/day: 0.25     Average packs/day: 0.3 packs/day for 4.0 years (1.0 ttl pk-yrs)     Types: Cigarettes, Cigars, E-Cigarettes    Smokeless tobacco: Never   Substance Use Topics    Alcohol use: Yes     Alcohol/week: 8.0 standard drinks of alcohol     Types: 4 Cans of beer, 4 Shots of liquor per week      Current Outpatient Medications   Medication Sig Dispense Refill    oxyCODONE-acetaminophen (PERCOCET) 7.5-325 MG per tablet Take 1 tablet by mouth every 4 hours as needed for Pain for up to 28 days. Intended supply: 28 days Max Daily Amount: 6 tablets 50 tablet 0     No current facility-administered medications for this visit.     No Known Allergies      Subjective:     Review of Systems   Constitutional:  Negative for activity change, appetite change, fatigue and fever.   HENT:  Negative for congestion, hearing loss, sinus pressure, sore throat and trouble swallowing.

## 2025-05-15 ENCOUNTER — OFFICE VISIT (OUTPATIENT)
Age: 25
End: 2025-05-15

## 2025-05-15 VITALS — BODY MASS INDEX: 27.13 KG/M2 | HEIGHT: 76 IN | WEIGHT: 222.8 LBS

## 2025-05-15 DIAGNOSIS — Z98.890 STATUS POST RIGHT ROTATOR CUFF REPAIR: Primary | ICD-10-CM

## 2025-05-15 PROCEDURE — 99024 POSTOP FOLLOW-UP VISIT: CPT | Performed by: ORTHOPAEDIC SURGERY

## 2025-05-15 ASSESSMENT — ENCOUNTER SYMPTOMS
RESPIRATORY NEGATIVE: 1
GASTROINTESTINAL NEGATIVE: 1
EYES NEGATIVE: 1
ALLERGIC/IMMUNOLOGIC NEGATIVE: 1

## 2025-05-15 NOTE — PROGRESS NOTES
[facility]  [facility]  [address]  [facility] [id number] [phone]       Harpreet Canseco (:  2000) is a 25 y.o. male,Established patient, here for evaluation of the following chief complaint(s):  Post-Op Check (Rt RCR  Sx: 3/17/2025)        Assessment & Plan  1. Subscapularis tendon tear:    The tendon quality is satisfactory, albeit with a tear. Refrain from heavy lifting until the 4-month lianna post-injury. Range of motion should ideally be closer to 100 percent rather than the current 90 to 95 percent. Resume work without restrictions, provided it does not involve strenuous physical activity. Avoid engaging in activities such as running cables or pulling objects until the 4-month lianna post-injury. Avoid pushing or pulling more than 25 pounds and overhead lifting. Discontinue formal physical therapy and engage in self-directed exercises if preferred. Continue golfing activities but avoid straining the subscapularis tendon. Continue with physical therapy for an additional 1 to 2 weeks to further enhance strength. Avoid resistance training and weightlifting until closer to the 4-month lianna post-injury, after which gradually increase the intensity as deemed necessary.    Follow-up  Follow up in 8 weeks.       ICD-10-CM    1. Status post right rotator cuff repair  Z98.890           Return in about 8 weeks (around 7/10/2025).    SUBJECTIVE/OBJECTIVE:  History of Present Illness  The patient presents for evaluation of a subscapularis tendon tear.    He is currently 8 weeks post-injury and reports a range of motion that is approximately 90 to 95 percent of the baseline. His occupation does not involve strenuous physical activity, and he has been able to return to work without restrictions since discontinuing the use of the sling. He expresses a desire to engage in golfing activities during an upcoming trip.    SOCIAL

## 2025-06-27 ENCOUNTER — OFFICE VISIT (OUTPATIENT)
Age: 25
End: 2025-06-27

## 2025-06-27 VITALS
HEART RATE: 87 BPM | DIASTOLIC BLOOD PRESSURE: 72 MMHG | TEMPERATURE: 99.5 F | RESPIRATION RATE: 20 BRPM | WEIGHT: 224 LBS | BODY MASS INDEX: 27.27 KG/M2 | SYSTOLIC BLOOD PRESSURE: 122 MMHG | OXYGEN SATURATION: 97 %

## 2025-06-27 DIAGNOSIS — S50.861A INSECT BITE OF RIGHT FOREARM, INITIAL ENCOUNTER: Primary | ICD-10-CM
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
## 2025-06-27 DIAGNOSIS — L08.9 SKIN INFECTION: ICD-10-CM

## 2025-06-27 RX ORDER — CEFTRIAXONE 1 G/1
1000 INJECTION, POWDER, FOR SOLUTION INTRAMUSCULAR; INTRAVENOUS ONCE
Status: COMPLETED | OUTPATIENT
Start: 2025-06-27 | End: 2025-06-27

## 2025-06-27 RX ORDER — SULFAMETHOXAZOLE AND TRIMETHOPRIM 800; 160 MG/1; MG/1
1 TABLET ORAL 2 TIMES DAILY
Qty: 20 TABLET | Refills: 0 | Status: SHIPPED | OUTPATIENT
Start: 2025-06-27 | End: 2025-07-07

## 2025-06-27 RX ORDER — MUPIROCIN 2 %
OINTMENT (GRAM) TOPICAL
Qty: 15 G | Refills: 0 | Status: SHIPPED | OUTPATIENT
Start: 2025-06-27

## 2025-06-27 RX ADMIN — CEFTRIAXONE 1000 MG: 1 INJECTION, POWDER, FOR SOLUTION INTRAMUSCULAR; INTRAVENOUS at 16:52

## 2025-06-27 ASSESSMENT — ENCOUNTER SYMPTOMS
VOMITING: 0
WHEEZING: 0
APNEA: 0
COUGH: 0
EYE PAIN: 0
TROUBLE SWALLOWING: 0
ABDOMINAL DISTENTION: 0
SINUS PRESSURE: 0
SINUS PAIN: 0
SHORTNESS OF BREATH: 0
EYE DISCHARGE: 0
STRIDOR: 0
NAUSEA: 0
CONSTIPATION: 0
DIARRHEA: 0
FACIAL SWELLING: 0
CHOKING: 0
ABDOMINAL PAIN: 0
SORE THROAT: 0
CHEST TIGHTNESS: 0
COLOR CHANGE: 0
EYE REDNESS: 0

## 2025-06-27 NOTE — PATIENT INSTRUCTIONS
Rocephin injection given today.    Bactrim prescribed: Take full course of antibiotic as prescribed.     Mupirocin prescribed; apply twice daily after washing with antibacterial soap.    Keep clean with antibacterial soap.    DO NOT attempt to \"pop\" the lesion; avoid touching or scratching the area.    Tylenol/ibuprofen as needed.     Follow up with pcp or return to clinic for recheck if sign/symptoms worsening.    Report to ER if symptoms become severe or systemic, such as fever/body aches/chills.     Any condition can change, despite proper treatment. Therefore, if symptoms still persist or worsen after treatment plan intitated today, either go to the nearest ER, or call PCP, or return to UC for further evaluation.    Urgent Care evaluation today is not a substitute for PCP visit. Follow up care is your responsibility to discuss and review this UC visit.

## 2025-06-27 NOTE — PROGRESS NOTES
[facility], [facility] ([place])  [facility]  [address]  [facility] [address]  Dept: [phone]  Dept Fax: [phone]    Harpreet Canseco is a 25 y.o. male who presents today for his medical conditions/complaints as noted below.  Harpreet Canseco is complaining of Other (Bug bite x 2 days, R- arm)        HPI:   Harpreet Canseco presents to the clinic today with complaints of an insect bite to the right forearm that has become erythematous and tender. Admits itching. Bite was first noticed a couple of days ago. Denies fever, body aches, chills, drainage from the area. No alleviating factors reported for this. Symptoms are moderate. Patient is stable.           Past Medical History:   Diagnosis Date    Asthma     Had asthma when i was a kid havent had an attack in 10 years    Shoulder pain        Past Surgical History:   Procedure Laterality Date    FRACTURE SURGERY  June 2013    toe    SHOULDER ARTHROSCOPY Right 3/17/2025    RIGHT SHOULDER ARTHROSCOPIC SUBSCAPULARIS REPAIR performed by Adarsh Davis MD at [facility] OR       Family History   Problem Relation Age of Onset    Other Mother         epilepsy    High Blood Pressure Father     Arthritis Maternal Grandfather        Social History     Tobacco Use    Smoking status: Former     Current packs/day: 0.25     Average packs/day: 0.3 packs/day for 4.0 years (1.0 ttl pk-yrs)     Types: Cigarettes, Cigars, E-Cigarettes    Smokeless tobacco: Never   Substance Use Topics    Alcohol use: Yes     Alcohol/week: 8.0 standard drinks of alcohol     Types: 4 Cans of beer, 4 Shots of liquor per week        Current Outpatient Medications   Medication Sig Dispense Refill    sulfamethoxazole-trimethoprim (BACTRIM DS;SEPTRA DS) 800-160 MG per tablet Take 1 tablet by mouth 2 times daily for 10 days 20 tablet 0    mupirocin (BACTROBAN) 2 % ointment Apply topically 3 times daily. 15 g 0     Current Facility-Administered Medications   Medication

## 2025-06-27 NOTE — PROGRESS NOTES
Medication was administered by Debbie Butts MA at 4:53 PM.    Medication: ceftriaxone(rocephin)  Amount:  1000mg (2.86ml)  Route: intramuscular  Site: Dorsogluteal right    Patient tolerated well.

## 2025-09-02 ENCOUNTER — OFFICE VISIT (OUTPATIENT)
Dept: INTERNAL MEDICINE | Age: 25
End: 2025-09-02
Payer: COMMERCIAL

## 2025-09-02 VITALS — WEIGHT: 225 LBS | BODY MASS INDEX: 27.4 KG/M2 | HEART RATE: 80 BPM | OXYGEN SATURATION: 98 % | HEIGHT: 76 IN

## 2025-09-02 DIAGNOSIS — R25.3 MUSCLE FASCICULATION: Primary | ICD-10-CM

## 2025-09-02 PROCEDURE — 99214 OFFICE O/P EST MOD 30 MIN: CPT | Performed by: INTERNAL MEDICINE

## 2025-09-02 RX ORDER — METHYLPREDNISOLONE 4 MG/1
TABLET ORAL
Qty: 1 KIT | Refills: 0 | Status: SHIPPED | OUTPATIENT
Start: 2025-09-02 | End: 2025-09-08

## (undated) DEVICE — TUBING PMP IRRIG GOFLO

## (undated) DEVICE — TUBING, SUCTION, 1/4" X 20', STRAIGHT: Brand: MEDLINE INDUSTRIES, INC.

## (undated) DEVICE — SHEET,DRAPE,53X77,STERILE: Brand: MEDLINE

## (undated) DEVICE — SUTURE MONOCRYL SZ 3 0 L18IN ABSRB UD PS 2 3 8 CIR REV CUT NDL MCP497G

## (undated) DEVICE — WEREWOLF FLOW 90 COBLATION WAND: Brand: COBLATION

## (undated) DEVICE — GLOVE SURG SZ 85 L12IN FNGR THK79MIL GRN LTX FREE

## (undated) DEVICE — 3M™ IOBAN™ 2 ANTIMICROBIAL INCISE DRAPE 6650EZ: Brand: IOBAN™ 2

## (undated) DEVICE — SHOULDER STABILIZATION KIT,                                    DISPOSABLE 12 PER BOX

## (undated) DEVICE — DRESSING TELFA BARRIER ISLE 4 X 6

## (undated) DEVICE — PAD,ARMBOARD,CONV,FOAM,2X8X20",12PR/CS: Brand: MEDLINE

## (undated) DEVICE — 4.5 MM INCISOR PLUS STRAIGHT                                    BLADES, POWER/EP-1, VIOLET, PACKAGED                                    6 PER BOX, STERILE

## (undated) DEVICE — NEEDLE SCORPION HD MEGA LOADER

## (undated) DEVICE — TIGERLINK SUTURETAPE 1.7MM  AR7538T

## (undated) DEVICE — SOLUTION IRRIG 3000ML 0.9% SOD CHL USP UROMATIC PLAS CONT

## (undated) DEVICE — DRESSING TRNSPAR W5XL4.5IN FLM SHT SEMIPERMEABLE WIND

## (undated) DEVICE — T-MAX DISPOSABLE FACE MASK 8 PER BOX

## (undated) DEVICE — 3M™ STERI-STRIP™ REINFORCED ADHESIVE SKIN CLOSURES, R1547, 1/2 IN X 4 IN (12 MM X 100 MM), 6 STRIPS/ENVELOPE: Brand: 3M™ STERI-STRIP™

## (undated) DEVICE — PAD,EYE,1-5/8X2 5/8,STERILE,LF,1/PK: Brand: MEDLINE

## (undated) DEVICE — DRAPE,SHOULDER,BEACH CHAIR,STERILE: Brand: MEDLINE

## (undated) DEVICE — Device